# Patient Record
Sex: MALE | Race: WHITE | NOT HISPANIC OR LATINO | Employment: OTHER | URBAN - METROPOLITAN AREA
[De-identification: names, ages, dates, MRNs, and addresses within clinical notes are randomized per-mention and may not be internally consistent; named-entity substitution may affect disease eponyms.]

---

## 2017-10-12 ENCOUNTER — HOSPITAL ENCOUNTER (OUTPATIENT)
Dept: OTHER | Facility: HOSPITAL | Age: 62
Setting detail: SPECIMEN
Discharge: HOME | End: 2017-10-12
Attending: UROLOGY | Admitting: UROLOGY

## 2017-10-12 LAB — SPECIMEN SOURCE: NORMAL

## 2021-12-20 ENCOUNTER — TRANSCRIBE ORDERS (OUTPATIENT)
Dept: ADMINISTRATIVE | Facility: HOSPITAL | Age: 66
End: 2021-12-20

## 2021-12-20 DIAGNOSIS — G44.009 CLUSTER HEADACHE, NOT INTRACTABLE, UNSPECIFIED CHRONICITY PATTERN: Primary | ICD-10-CM

## 2022-01-03 ENCOUNTER — HOSPITAL ENCOUNTER (OUTPATIENT)
Dept: CT IMAGING | Facility: HOSPITAL | Age: 67
Discharge: HOME | End: 2022-01-03
Admitting: FAMILY MEDICINE

## 2022-01-03 DIAGNOSIS — G44.009 CLUSTER HEADACHE, NOT INTRACTABLE, UNSPECIFIED CHRONICITY PATTERN: ICD-10-CM

## 2022-01-03 LAB — CREAT BLDA-MCNC: 1.3 MG/DL

## 2022-01-03 PROCEDURE — 0 IOPAMIDOL PER 1 ML: Performed by: FAMILY MEDICINE

## 2022-01-03 PROCEDURE — 70470 CT HEAD/BRAIN W/O & W/DYE: CPT

## 2022-01-03 PROCEDURE — 82565 ASSAY OF CREATININE: CPT

## 2022-01-03 RX ADMIN — IOPAMIDOL 50 ML: 755 INJECTION, SOLUTION INTRAVENOUS at 16:57

## 2022-07-18 ENCOUNTER — HOSPITAL ENCOUNTER (OUTPATIENT)
Dept: GENERAL RADIOLOGY | Facility: HOSPITAL | Age: 67
Discharge: HOME | End: 2022-07-18
Admitting: UROLOGY

## 2022-07-18 ENCOUNTER — TRANSCRIBE ORDERS (OUTPATIENT)
Dept: GENERAL RADIOLOGY | Facility: HOSPITAL | Age: 67
End: 2022-07-18

## 2022-07-18 ENCOUNTER — TRANSCRIBE ORDERS (OUTPATIENT)
Dept: ADMINISTRATIVE | Facility: HOSPITAL | Age: 67
End: 2022-07-18

## 2022-07-18 DIAGNOSIS — N13.8 BPH WITH URINARY OBSTRUCTION: ICD-10-CM

## 2022-07-18 DIAGNOSIS — Z87.442 PERSONAL HISTORY OF URINARY CALCULI: ICD-10-CM

## 2022-07-18 DIAGNOSIS — N40.1 BPH WITH URINARY OBSTRUCTION: ICD-10-CM

## 2022-07-18 DIAGNOSIS — N20.0 CALCULUS, RENAL: ICD-10-CM

## 2022-07-18 DIAGNOSIS — N20.0 CALCULUS, RENAL: Primary | ICD-10-CM

## 2022-07-18 PROCEDURE — 74018 RADEX ABDOMEN 1 VIEW: CPT

## 2022-08-02 ENCOUNTER — OFFICE VISIT (OUTPATIENT)
Dept: SURGERY | Facility: CLINIC | Age: 67
End: 2022-08-02

## 2022-08-02 ENCOUNTER — PREP FOR SURGERY (OUTPATIENT)
Dept: OTHER | Facility: HOSPITAL | Age: 67
End: 2022-08-02

## 2022-08-02 VITALS — OXYGEN SATURATION: 97 % | HEIGHT: 72 IN | WEIGHT: 221.2 LBS | BODY MASS INDEX: 29.96 KG/M2 | HEART RATE: 59 BPM

## 2022-08-02 DIAGNOSIS — Z86.010 HISTORY OF COLONIC POLYPS: ICD-10-CM

## 2022-08-02 DIAGNOSIS — Z12.11 SCREENING FOR MALIGNANT NEOPLASM OF COLON: Primary | ICD-10-CM

## 2022-08-02 PROBLEM — Z86.0100 HISTORY OF COLONIC POLYPS: Status: ACTIVE | Noted: 2022-08-02

## 2022-08-02 PROCEDURE — S0260 H&P FOR SURGERY: HCPCS | Performed by: NURSE PRACTITIONER

## 2022-08-02 RX ORDER — ALLOPURINOL 300 MG/1
300 TABLET ORAL NIGHTLY
COMMUNITY
Start: 2022-06-13

## 2022-08-02 RX ORDER — POLYETHYLENE GLYCOL 3350 17 G/17G
POWDER, FOR SOLUTION ORAL
Qty: 238 PACKET | Refills: 0 | Status: ON HOLD | OUTPATIENT
Start: 2022-08-02 | End: 2022-11-14

## 2022-08-02 RX ORDER — PANTOPRAZOLE SODIUM 40 MG/1
40 TABLET, DELAYED RELEASE ORAL DAILY
COMMUNITY
Start: 2022-06-13

## 2022-08-02 RX ORDER — HYDROCHLOROTHIAZIDE 12.5 MG/1
12.5 TABLET ORAL DAILY
COMMUNITY
Start: 2022-06-13

## 2022-08-02 NOTE — PROGRESS NOTES
Chief Complaint: Colonoscopy    Subjective      Colonoscopy consultation       History of Present Illness  Ramesh Garcia is a 66 y.o. male presents to [facility] for colonoscopy consultation.    Patient presents today on referral from Dr. Sushant Stack for colonoscopy consultation.  Patient denies any abdominal pain, change in bowel habit, rectal bleeding.    Denies any family history of colorectal cancer.     Patient reports last colonoscopy was 10 years ago and had several polyps removed.    Objective     Past Medical History:   Diagnosis Date   • Cancer of kidney (HCC)    • Concussion    • Hypertension    • Hypothyroidism        Past Surgical History:   Procedure Laterality Date   • CERVICAL SPINE SURGERY     • ELBOW PROCEDURE     • KIDNEY SURGERY  2006    removed 2006 cancer   • NECK SURGERY  10/23/2015    ACDF C5-C6 and C6-C7-Dr. Suggs   • SHOULDER SURGERY     • SHOULDER SURGERY      left 2010, right 2012       Outpatient Medications Marked as Taking for the 8/2/22 encounter (Office Visit) with Kyra Quintana APRN   Medication Sig Dispense Refill   • allopurinol (ZYLOPRIM) 300 MG tablet      • aspirin 81 MG tablet Take by mouth.     • carvedilol (COREG) 6.25 MG tablet Take 6.25 mg by mouth 2 (two) times a day with meals.       • diltiazem CD (CARDIZEM CD) 300 MG 24 hr capsule Take by mouth.     • Fenofibrate 150 MG capsule Take by mouth.     • hydroCHLOROthiazide (HYDRODIURIL) 12.5 MG tablet      • levothyroxine (SYNTHROID, LEVOTHROID) 137 MCG tablet Take by mouth.     • pantoprazole (PROTONIX) 40 MG EC tablet          No Known Allergies     Family History   Problem Relation Age of Onset   • Hypertension Mother    • Breast cancer Mother    • Hypertension Father    • Heart attack Father    • Breast cancer Sister    • Breast cancer Paternal Grandmother        Social History     Socioeconomic History   • Marital status:    Tobacco Use   • Smoking status: Former Smoker   •  "Smokeless tobacco: Former User   Vaping Use   • Vaping Use: Never used   Substance and Sexual Activity   • Alcohol use: No       Review of Systems   Constitutional: Negative for chills and fever.   Gastrointestinal: Negative for abdominal distention, abdominal pain, anal bleeding, blood in stool, constipation, diarrhea and rectal pain.        Vital Signs:   Pulse 59   Ht 182.9 cm (72\")   Wt 100 kg (221 lb 3.2 oz)   SpO2 97%   BMI 30.00 kg/m²      Physical Exam  Constitutional:       General: He is not in acute distress.     Appearance: Normal appearance.   HENT:      Head: Normocephalic.   Cardiovascular:      Rate and Rhythm: Normal rate.   Pulmonary:      Effort: Pulmonary effort is normal.      Breath sounds: No stridor. No wheezing.   Abdominal:      General: Abdomen is flat.      Palpations: Abdomen is soft.      Tenderness: There is no abdominal tenderness.   Musculoskeletal:         General: No deformity. Normal range of motion.   Skin:     General: Skin is warm and dry.      Coloration: Skin is not jaundiced.   Neurological:      General: No focal deficit present.      Mental Status: He is alert and oriented to person, place, and time.   Psychiatric:         Mood and Affect: Mood normal.         Thought Content: Thought content normal.          Result Review :          []  Laboratory  []  Radiology  []  Pathology  []  Microbiology  []  EKG/Telemetry   []  Cardiology/Vascular   [x]  Old records  Today I have reviewed Dr. Stack's previous office note.     Assessment and Plan    Diagnoses and all orders for this visit:    1. Screening for malignant neoplasm of colon (Primary)    2. History of colonic polyps    Other orders  -     polyethylene glycol (MIRALAX) 17 g packet; Take as directed.  Instructions given in office.  Dispense: 238 g bottle  Dispense: 238 packet; Refill: 0    orange prep    Follow Up   Return for Schedule colonoscopy with Dr. Purcell in 11/4/2022 at [facility]. "     Hospital arrival time: 0730    Possible risks/complications, benefits, and alternatives to surgical or invasive procedure have been explained to patient and/or legal guardian.     Patient has been evaluated and can tolerate anesthesia and/or sedation. Risks, benefits, and alternatives to anesthesia and sedation have been explained to patient and/or legal guardian.  Patient verbalizes understanding and is will proceed with above plan.    Patient was given instructions and counseling regarding his condition or for health maintenance advice. Please see specific information pulled into the AVS if appropriate.

## 2022-11-01 ENCOUNTER — TELEPHONE (OUTPATIENT)
Dept: SURGERY | Facility: CLINIC | Age: 67
End: 2022-11-01

## 2022-11-01 NOTE — TELEPHONE ENCOUNTER
Caller: SARA     Relationship: [facility]     Best call back number: [phone]    INCOMING CALL FROM PT [facility]. REQUESTING COLONOSCOPY PREP INSTRUCTIONS BE RE-MAILED TO THEM.

## 2022-11-03 ENCOUNTER — TRANSCRIBE ORDERS (OUTPATIENT)
Dept: ADMINISTRATIVE | Facility: HOSPITAL | Age: 67
End: 2022-11-03

## 2022-11-03 DIAGNOSIS — N20.0 CALCULUS OF KIDNEY: Primary | ICD-10-CM

## 2022-11-09 ENCOUNTER — HOSPITAL ENCOUNTER (OUTPATIENT)
Dept: CT IMAGING | Facility: HOSPITAL | Age: 67
Discharge: HOME | End: 2022-11-09
Admitting: FAMILY MEDICINE

## 2022-11-09 DIAGNOSIS — N20.0 CALCULUS OF KIDNEY: ICD-10-CM

## 2022-11-09 PROCEDURE — 74176 CT ABD & PELVIS W/O CONTRAST: CPT

## 2022-11-10 ENCOUNTER — TELEPHONE (OUTPATIENT)
Dept: UROLOGY | Facility: CLINIC | Age: 67
End: 2022-11-10

## 2022-11-10 RX ORDER — CARVEDILOL 12.5 MG/1
12.5 TABLET ORAL 2 TIMES DAILY WITH MEALS
COMMUNITY

## 2022-11-10 NOTE — TELEPHONE ENCOUNTER
----- Message from Deidre Joseph sent at 11/10/2022  9:20 AM EST -----  Regarding: FW: APPT  3-4 weeks  ----- Message -----  From: Jaime Foy RegSched Rep  Sent: 11/10/2022   8:56 AM EST  To: Deidre Joseph  Subject: APPT                                             REFERRAL IN [facility]/ [facility]/PT NOW HAD CT/RECORDS IN Epic/PLEASE ADVISE ON APPT??

## 2022-11-14 ENCOUNTER — ANESTHESIA EVENT (OUTPATIENT)
Dept: GASTROENTEROLOGY | Facility: HOSPITAL | Age: 67
End: 2022-11-14

## 2022-11-14 ENCOUNTER — HOSPITAL ENCOUNTER (OUTPATIENT)
Facility: HOSPITAL | Age: 67
Setting detail: HOSPITAL OUTPATIENT SURGERY
Discharge: HOME | End: 2022-11-14
Attending: SURGERY | Admitting: SURGERY

## 2022-11-14 ENCOUNTER — ANESTHESIA (OUTPATIENT)
Dept: GASTROENTEROLOGY | Facility: HOSPITAL | Age: 67
End: 2022-11-14

## 2022-11-14 VITALS
OXYGEN SATURATION: 95 % | SYSTOLIC BLOOD PRESSURE: 136 MMHG | RESPIRATION RATE: 18 BRPM | DIASTOLIC BLOOD PRESSURE: 67 MMHG | HEART RATE: 54 BPM | WEIGHT: 216.93 LBS | TEMPERATURE: 97.6 F | BODY MASS INDEX: 29.38 KG/M2 | HEIGHT: 72 IN

## 2022-11-14 PROCEDURE — 25010000002 PROPOFOL 10 MG/ML EMULSION: Performed by: NURSE ANESTHETIST, CERTIFIED REGISTERED

## 2022-11-14 RX ORDER — ONDANSETRON 4 MG/1
4 TABLET, FILM COATED ORAL ONCE AS NEEDED
Status: DISCONTINUED | OUTPATIENT
Start: 2022-11-14 | End: 2022-11-14 | Stop reason: HOSPADM

## 2022-11-14 RX ORDER — SODIUM CHLORIDE, SODIUM LACTATE, POTASSIUM CHLORIDE, CALCIUM CHLORIDE 600; 310; 30; 20 MG/100ML; MG/100ML; MG/100ML; MG/100ML
30 INJECTION, SOLUTION INTRAVENOUS CONTINUOUS
Status: DISCONTINUED | OUTPATIENT
Start: 2022-11-14 | End: 2022-11-14 | Stop reason: HOSPADM

## 2022-11-14 RX ORDER — LIDOCAINE HYDROCHLORIDE 20 MG/ML
INJECTION, SOLUTION EPIDURAL; INFILTRATION; INTRACAUDAL; PERINEURAL AS NEEDED
Status: DISCONTINUED | OUTPATIENT
Start: 2022-11-14 | End: 2022-11-14 | Stop reason: SURG

## 2022-11-14 RX ORDER — PROPOFOL 10 MG/ML
VIAL (ML) INTRAVENOUS AS NEEDED
Status: DISCONTINUED | OUTPATIENT
Start: 2022-11-14 | End: 2022-11-14 | Stop reason: SURG

## 2022-11-14 RX ORDER — ONDANSETRON 2 MG/ML
4 INJECTION INTRAMUSCULAR; INTRAVENOUS ONCE AS NEEDED
Status: DISCONTINUED | OUTPATIENT
Start: 2022-11-14 | End: 2022-11-14 | Stop reason: HOSPADM

## 2022-11-14 RX ADMIN — PROPOFOL 80 MG: 10 INJECTION, EMULSION INTRAVENOUS at 08:39

## 2022-11-14 RX ADMIN — LIDOCAINE HYDROCHLORIDE 30 MG: 20 INJECTION, SOLUTION EPIDURAL; INFILTRATION; INTRACAUDAL; PERINEURAL at 08:39

## 2022-11-14 RX ADMIN — SODIUM CHLORIDE, POTASSIUM CHLORIDE, SODIUM LACTATE AND CALCIUM CHLORIDE 30 ML/HR: 600; 310; 30; 20 INJECTION, SOLUTION INTRAVENOUS at 07:54

## 2022-11-14 RX ADMIN — PROPOFOL 150 MCG/KG/MIN: 10 INJECTION, EMULSION INTRAVENOUS at 08:39

## 2022-11-14 NOTE — ANESTHESIA POSTPROCEDURE EVALUATION
Patient: Ramesh Garcia    Procedure Summary     Date: 11/14/22 Room / Location: [facility] ENDOSCOPY 1 / [facility]    Anesthesia Start: 0835 Anesthesia Stop: 0901    Procedure: COLONOSCOPY Diagnosis:       Screening for malignant neoplasm of colon      History of colonic polyps      (Screening for malignant neoplasm of colon [Z12.11])      (History of colonic polyps [Z86.010])    Surgeons: Maik Purcell MD Provider: John Cuevas MD    Anesthesia Type: general ASA Status: 3          Anesthesia Type: general    Vitals  Vitals Value Taken Time   /61 11/14/22 0911   Temp 36.4 °C (97.6 °F) 11/14/22 0900   Pulse 54 11/14/22 0915   Resp 15 11/14/22 0905   SpO2 95 % 11/14/22 0915   Vitals shown include unvalidated device data.        Post Anesthesia Care and Evaluation    Patient location during evaluation: bedside  Patient participation: complete - patient participated  Level of consciousness: awake  Pain management: adequate    Airway patency: patent  Anesthetic complications: No anesthetic complications  PONV Status: none  Cardiovascular status: acceptable and stable  Respiratory status: acceptable  Hydration status: acceptable    Comments: An Anesthesiologist personally participated in the most demanding procedures (including induction and emergence if applicable) in the anesthesia plan, monitored the course of anesthesia administration at frequent intervals and remained physically present and available for immediate diagnosis and treatment of emergencies.

## 2022-11-14 NOTE — H&P
[facility]   HISTORY AND PHYSICAL    Patient Name: Ramesh Garcia  : 1955  MRN: [id number]  Primary Care Physician:  Sushant Stack MD  Date of admission: 2022    Subjective   Subjective     Chief Complaint: Colon cancer screening    HPI:    Ramesh Garcia is a 67 y.o. male who has a prior history of colon polyps.  He was referred back for repeat colon screening.    Review of Systems   Respiratory: Negative for shortness of breath.    Cardiovascular: Negative for chest pain.   Gastrointestinal: Negative for abdominal pain.       Personal History     Past Medical History:   Diagnosis Date   • Cancer of kidney (HCC)    • Concussion    • GERD (gastroesophageal reflux disease)    • Hyperlipidemia    • Hypertension    • Hypothyroidism    • Kidney stones    • Sleep apnea        Past Surgical History:   Procedure Laterality Date   • CERVICAL SPINE SURGERY     • ELBOW PROCEDURE     • KIDNEY SURGERY      removed 2006 cancer   • NECK SURGERY  10/23/2015    ACDF C5-C6 and C6-C7-Dr. Suggs   • SHOULDER SURGERY     • SHOULDER SURGERY      left , right        Family History: family history includes Breast cancer in his mother, paternal grandmother, and sister; Heart attack in his father; Hypertension in his father and mother. Otherwise pertinent FHx was reviewed and not pertinent to current issue.    Social History:  reports that he has quit smoking. He has quit using smokeless tobacco. He reports that he does not drink alcohol and does not use drugs.    Home Medications:  allopurinol, aspirin, carvedilol, choline fenofibrate, dilTIAZem CD, hydroCHLOROthiazide, levothyroxine, and pantoprazole    Allergies:  No Known Allergies    Objective    Objective     Vitals:   Temp:  [98.9 °F (37.2 °C)] 98.9 °F (37.2 °C)  Heart Rate:  [59] 59  Resp:  [18] 18  BP: (141)/(79) 141/79    Physical Exam  HENT:      Head: Normocephalic.   Cardiovascular:      Rate and Rhythm: Normal rate.   Pulmonary:       Effort: Pulmonary effort is normal.   Abdominal:      Palpations: Abdomen is soft.   Musculoskeletal:         General: Normal range of motion.      Cervical back: Normal range of motion.   Skin:     General: Skin is warm.   Neurological:      General: No focal deficit present.      Mental Status: He is alert.   Psychiatric:         Mood and Affect: Mood normal.         Result Review    Result Review:  I have personally reviewed the results from the time of this admission to 11/14/2022 08:31 EST and agree with these findings:  []  Laboratory  []  Microbiology  []  Radiology  []  EKG/Telemetry   []  Cardiology/Vascular   []  Pathology  []  Old records  []  Other:  Most notable findings include:     Assessment & Plan   Assessment / Plan     Brief Patient Summary:  Ramesh Garcia is a 67 y.o. male who was referred for colon screening.    Active Hospital Problems:  Active Hospital Problems    Diagnosis    • Screening for malignant neoplasm of colon    • History of colonic polyps        Plan:   We will proceed with a colonoscopy.  Risk benefits and alternatives were explained.    DVT prophylaxis:  No DVT prophylaxis order currently exists.    CODE STATUS:         Admission Status:  I believe this patient meets outpatient status.    Electronically signed by Maik Purcell MD, 11/14/22, 8:31 AM EST.

## 2022-11-14 NOTE — ANESTHESIA PREPROCEDURE EVALUATION
Anesthesia Evaluation     Patient summary reviewed and Nursing notes reviewed   no history of anesthetic complications:  NPO Solid Status: > 8 hours  NPO Liquid Status: > 2 hours           Airway   Mallampati: III  TM distance: >3 FB  Neck ROM: full  No difficulty expected  Dental    (+) upper dentures and lower dentures    Pulmonary - normal exam    breath sounds clear to auscultation  (+) sleep apnea,   Cardiovascular - normal exam  Exercise tolerance: good (4-7 METS)    Rhythm: regular  Rate: normal    (+) hypertension, hyperlipidemia,       Neuro/Psych  (+) numbness,    GI/Hepatic/Renal/Endo    (+)  GERD,  renal disease CRI, thyroid problem hypothyroidism    Musculoskeletal (-) negative ROS    Abdominal    Substance History - negative use     OB/GYN negative ob/gyn ROS         Other      history of cancer    ROS/Med Hx Other: PAT Nursing Notes unavailable.       Phys Exam Other: Partial lower  Full upper                Anesthesia Plan    ASA 3     general     (Total IV Anesthesia    Patient understands anesthesia not responsible for dental damage.  )  intravenous induction     Anesthetic plan, risks, benefits, and alternatives have been provided, discussed and informed consent has been obtained with: patient.    Plan discussed with CRNA.        CODE STATUS:

## 2022-12-04 PROBLEM — N20.0 NEPHROLITHIASIS: Status: ACTIVE | Noted: 2022-12-04

## 2022-12-04 PROBLEM — Z90.5 SOLITARY KIDNEY, ACQUIRED: Status: ACTIVE | Noted: 2022-12-04

## 2022-12-04 NOTE — PROGRESS NOTES
Chief Complaint: Urologic complaint    Subjective         History of Present Illness  Ramesh Garcia is a 67 y.o. male       Solitary right kidney  Nephrolithiasis      Here to establish care [facility] with [facility] lives in this area      Patient with some frequency, no straining.  Voiding without issue no prostate meds      No GH      Depending on his health should come out, complicated by solitary kidney.      11/9/2022 CT abdomen/pelvis without - prior left nephrectomy, 1.2 cm nonobstructing stone lower pole right kidney.  Significantly larger than it was on scan from 2011.  Images reviewed.     6/22 1.1, GFR 68             12/17 calcium oxalate dihydrate 20, calcium oxalate monohydrate 80      On allopurinol for stone -history of uric acid nephrolithiasis    5/18 Litholink - uric acid levels down on allopurinol, urine oxalate decreased, urine calcium elevated.    No urologic family history    No cardiopulmonary history.  Non-smoker.  ASA 81      Patient has never passed stones, 7 lithotripsies several ESWL's      2006 left nephrectomy renal cell carcinoma [facility]      PSA    7/22   0.98       1/21   0.9  3/19   0.9  9/15   0.7                        Objective     Past Medical History:   Diagnosis Date   • Cancer of kidney (HCC)    • Concussion    • GERD (gastroesophageal reflux disease)    • Hyperlipidemia    • Hypertension    • Hypothyroidism    • Kidney stones    • Sleep apnea        Past Surgical History:   Procedure Laterality Date   • CERVICAL SPINE SURGERY     • COLONOSCOPY N/A 11/14/2022    Procedure: COLONOSCOPY;  Surgeon: Maik Purcell MD;  [facility]: [facility] END[facility];  Service: General;  Laterality: N/A;  DIVERTICULOSIS   • ELBOW PROCEDURE     • KIDNEY SURGERY  2006    removed 2006 cancer   • NECK SURGERY  10/23/2015    ACDF C5-C6 and C6-C7-Dr. Suggs   • SHOULDER SURGERY     • SHOULDER SURGERY      left 2010, right 2012         Current Outpatient Medications:   •   allopurinol (ZYLOPRIM) 300 MG tablet, Take 1 tablet by mouth Daily., Disp: , Rfl:   •  aspirin 81 MG tablet, Take 1 tablet by mouth Daily., Disp: , Rfl:   •  carvedilol (COREG) 12.5 MG tablet, Take 1 tablet by mouth 2 (Two) Times a Day With Meals., Disp: , Rfl:   •  choline fenofibrate (TRILIPIX) 135 MG capsule, Take 1 capsule by mouth Daily., Disp: , Rfl:   •  diltiazem CD (CARDIZEM CD) 300 MG 24 hr capsule, Take 1 capsule by mouth Daily., Disp: , Rfl:   •  hydroCHLOROthiazide (HYDRODIURIL) 12.5 MG tablet, Take 1 tablet by mouth Daily., Disp: , Rfl:   •  levothyroxine (SYNTHROID, LEVOTHROID) 137 MCG tablet, Take 1 tablet by mouth Daily., Disp: , Rfl:   •  pantoprazole (PROTONIX) 40 MG EC tablet, Take 1 tablet by mouth Daily., Disp: , Rfl:     No Known Allergies     Family History   Problem Relation Age of Onset   • Hypertension Mother    • Breast cancer Mother    • Hypertension Father    • Heart attack Father    • Breast cancer Sister    • Breast cancer Paternal Grandmother    • Malig Hyperthermia Neg Hx        Social History     Socioeconomic History   • Marital status:    Tobacco Use   • Smoking status: Former   • Smokeless tobacco: Former   • Tobacco comments:     Quit- over 30 years ago   Vaping Use   • Vaping Use: Never used   Substance and Sexual Activity   • Alcohol use: No   • Drug use: Never   • Sexual activity: Defer       Vital Signs:   There were no vitals taken for this visit.     Physical exam    Alert and orient x3  Well appearing, well developed, in no acute distress   Unlabored respirations  Nontender/nondistended      Grossly oriented to person, place and time, judgment is intact, normal mood and affect    Results for orders placed or performed during the hospital encounter of 01/03/22   POC Creatinine    Specimen: Blood   Result Value Ref Range    Creatinine 1.30 mg/dL    GFR MDRD       GFR MDRD Non African American 55 mL/min/1.73 sq.M             Assessment and Plan     Diagnoses and all orders for this visit:    1. Nephrolithiasis (Primary)    2. Solitary kidney, acquired        CT images and read reviewed and discussed with patient    Records reviewed today and summarized in the chart      Patient with a solitary kidney and 1 cm with a stone in the lower pole of the right solitary kidney.  I did recommend cystoscopy with right ureteroscopy with laser and right ureteral stent placement.  We will do this to prevent renal failure if he went to pass the stones.  Risks and benefits were discussed including bleeding, infection and damage to the urinary system.  We also discussed the risk of anesthesia up to and including death.  Patient voiced understanding and would like to proceed.

## 2022-12-06 ENCOUNTER — PREP FOR SURGERY (OUTPATIENT)
Dept: OTHER | Facility: HOSPITAL | Age: 67
End: 2022-12-06

## 2022-12-06 ENCOUNTER — OFFICE VISIT (OUTPATIENT)
Dept: UROLOGY | Facility: CLINIC | Age: 67
End: 2022-12-06

## 2022-12-06 VITALS — HEIGHT: 70 IN | BODY MASS INDEX: 31.92 KG/M2 | RESPIRATION RATE: 18 BRPM | WEIGHT: 223 LBS

## 2022-12-06 DIAGNOSIS — N20.1 URETERAL STONE: Primary | ICD-10-CM

## 2022-12-06 DIAGNOSIS — Z90.5 SOLITARY KIDNEY, ACQUIRED: ICD-10-CM

## 2022-12-06 DIAGNOSIS — N20.0 NEPHROLITHIASIS: Primary | ICD-10-CM

## 2022-12-06 DIAGNOSIS — N20.0 NEPHROLITHIASIS: ICD-10-CM

## 2022-12-06 PROCEDURE — 99204 OFFICE O/P NEW MOD 45 MIN: CPT | Performed by: UROLOGY

## 2022-12-06 RX ORDER — SODIUM CHLORIDE 9 MG/ML
100 INJECTION, SOLUTION INTRAVENOUS CONTINUOUS
Status: CANCELLED | OUTPATIENT
Start: 2022-12-06

## 2022-12-06 RX ORDER — LEVOFLOXACIN 5 MG/ML
500 INJECTION, SOLUTION INTRAVENOUS ONCE
Status: CANCELLED | OUTPATIENT
Start: 2022-12-06 | End: 2022-12-06

## 2022-12-27 ENCOUNTER — TELEPHONE (OUTPATIENT)
Dept: UROLOGY | Facility: CLINIC | Age: 67
End: 2022-12-27

## 2023-01-09 ENCOUNTER — LAB (OUTPATIENT)
Dept: LAB | Facility: HOSPITAL | Age: 68
End: 2023-01-09
Payer: MEDICARE

## 2023-01-09 ENCOUNTER — APPOINTMENT (OUTPATIENT)
Dept: CT IMAGING | Facility: HOSPITAL | Age: 68
End: 2023-01-09

## 2023-01-09 DIAGNOSIS — Z90.5 SOLITARY KIDNEY, ACQUIRED: ICD-10-CM

## 2023-01-09 DIAGNOSIS — N20.0 NEPHROLITHIASIS: ICD-10-CM

## 2023-01-09 PROCEDURE — 87086 URINE CULTURE/COLONY COUNT: CPT

## 2023-01-10 LAB — BACTERIA SPEC AEROBE CULT: NO GROWTH

## 2023-01-17 RX ORDER — MULTIPLE VITAMINS W/ MINERALS TAB 9MG-400MCG
1 TAB ORAL DAILY
COMMUNITY

## 2023-01-17 RX ORDER — CHLORAL HYDRATE 500 MG
CAPSULE ORAL
COMMUNITY

## 2023-01-17 NOTE — PRE-PROCEDURE INSTRUCTIONS
PRE-OP INSTRUCTIONS REVIEWED WITH PATIENT: FASTING/BATHING/ARRIVAL PROCEDURES.  INSTRUCTED TO TAKE A.M. DAY OF SURGERY: CARVEDILOL, CARDIZEM, SYNTHROID, PANTOPRAZOL  UNDERSTANDING VERBALIZED.

## 2023-01-19 ENCOUNTER — APPOINTMENT (OUTPATIENT)
Dept: GENERAL RADIOLOGY | Facility: HOSPITAL | Age: 68
End: 2023-01-19
Payer: MEDICARE

## 2023-01-19 ENCOUNTER — ANESTHESIA (OUTPATIENT)
Dept: PERIOP | Facility: HOSPITAL | Age: 68
End: 2023-01-19
Payer: MEDICARE

## 2023-01-19 ENCOUNTER — ANESTHESIA EVENT (OUTPATIENT)
Dept: PERIOP | Facility: HOSPITAL | Age: 68
End: 2023-01-19
Payer: MEDICARE

## 2023-01-19 ENCOUNTER — HOSPITAL ENCOUNTER (OUTPATIENT)
Facility: HOSPITAL | Age: 68
Discharge: HOME | End: 2023-01-19
Attending: UROLOGY | Admitting: UROLOGY
Payer: MEDICARE

## 2023-01-19 VITALS
RESPIRATION RATE: 16 BRPM | DIASTOLIC BLOOD PRESSURE: 61 MMHG | TEMPERATURE: 97.2 F | HEIGHT: 71 IN | SYSTOLIC BLOOD PRESSURE: 121 MMHG | OXYGEN SATURATION: 97 % | WEIGHT: 221.78 LBS | BODY MASS INDEX: 31.05 KG/M2 | HEART RATE: 56 BPM

## 2023-01-19 DIAGNOSIS — N20.0 NEPHROLITHIASIS: ICD-10-CM

## 2023-01-19 LAB
ANION GAP SERPL CALCULATED.3IONS-SCNC: 11.9 MMOL/L (ref 5–15)
BUN SERPL-MCNC: 27 MG/DL (ref 8–23)
BUN/CREAT SERPL: 21.6 (ref 7–25)
CALCIUM SPEC-SCNC: 9.5 MG/DL (ref 8.6–10.5)
CHLORIDE SERPL-SCNC: 100 MMOL/L (ref 98–107)
CO2 SERPL-SCNC: 24.1 MMOL/L (ref 22–29)
CREAT SERPL-MCNC: 1.25 MG/DL (ref 0.76–1.27)
EGFRCR SERPLBLD CKD-EPI 2021: 62.7 ML/MIN/1.73
GLUCOSE SERPL-MCNC: 114 MG/DL (ref 65–99)
POTASSIUM SERPL-SCNC: 3.8 MMOL/L (ref 3.5–5.2)
SODIUM SERPL-SCNC: 136 MMOL/L (ref 136–145)

## 2023-01-19 PROCEDURE — 25010000002 LEVOFLOXACIN PER 250 MG: Performed by: UROLOGY

## 2023-01-19 PROCEDURE — 25010000002 ONDANSETRON PER 1 MG: Performed by: NURSE ANESTHETIST, CERTIFIED REGISTERED

## 2023-01-19 PROCEDURE — 74018 RADEX ABDOMEN 1 VIEW: CPT

## 2023-01-19 PROCEDURE — 25010000002 DEXAMETHASONE PER 1 MG: Performed by: NURSE ANESTHETIST, CERTIFIED REGISTERED

## 2023-01-19 PROCEDURE — 25010000002 MIDAZOLAM PER 1 MG: Performed by: ANESTHESIOLOGY

## 2023-01-19 PROCEDURE — 88300 SURGICAL PATH GROSS: CPT | Performed by: UROLOGY

## 2023-01-19 PROCEDURE — 52352 CYSTOURETERO W/STONE REMOVE: CPT | Performed by: UROLOGY

## 2023-01-19 PROCEDURE — 25010000002 FENTANYL CITRATE (PF) 50 MCG/ML SOLUTION: Performed by: NURSE ANESTHETIST, CERTIFIED REGISTERED

## 2023-01-19 PROCEDURE — C2617 STENT, NON-COR, TEM W/O DEL: HCPCS | Performed by: UROLOGY

## 2023-01-19 PROCEDURE — 80048 BASIC METABOLIC PNL TOTAL CA: CPT | Performed by: UROLOGY

## 2023-01-19 PROCEDURE — 82365 CALCULUS SPECTROSCOPY: CPT | Performed by: UROLOGY

## 2023-01-19 PROCEDURE — 76000 FLUOROSCOPY <1 HR PHYS/QHP: CPT

## 2023-01-19 PROCEDURE — C1769 GUIDE WIRE: HCPCS | Performed by: UROLOGY

## 2023-01-19 PROCEDURE — 93010 ELECTROCARDIOGRAM REPORT: CPT | Performed by: INTERNAL MEDICINE

## 2023-01-19 PROCEDURE — C1894 INTRO/SHEATH, NON-LASER: HCPCS | Performed by: UROLOGY

## 2023-01-19 PROCEDURE — C1758 CATHETER, URETERAL: HCPCS | Performed by: UROLOGY

## 2023-01-19 PROCEDURE — 52332 CYSTOSCOPY AND TREATMENT: CPT | Performed by: UROLOGY

## 2023-01-19 PROCEDURE — 25010000002 PROPOFOL 10 MG/ML EMULSION: Performed by: NURSE ANESTHETIST, CERTIFIED REGISTERED

## 2023-01-19 PROCEDURE — 93005 ELECTROCARDIOGRAM TRACING: CPT | Performed by: UROLOGY

## 2023-01-19 DEVICE — STNT URETRL CLASSC DBL PIG 6F 28CM: Type: IMPLANTABLE DEVICE | Site: URETER | Status: FUNCTIONAL

## 2023-01-19 RX ORDER — MEPERIDINE HYDROCHLORIDE 25 MG/ML
12.5 INJECTION INTRAMUSCULAR; INTRAVENOUS; SUBCUTANEOUS
Status: DISCONTINUED | OUTPATIENT
Start: 2023-01-19 | End: 2023-01-19 | Stop reason: HOSPADM

## 2023-01-19 RX ORDER — PROMETHAZINE HYDROCHLORIDE 12.5 MG/1
12.5 TABLET ORAL ONCE AS NEEDED
Status: DISCONTINUED | OUTPATIENT
Start: 2023-01-19 | End: 2023-01-19 | Stop reason: HOSPADM

## 2023-01-19 RX ORDER — ONDANSETRON 2 MG/ML
4 INJECTION INTRAMUSCULAR; INTRAVENOUS ONCE AS NEEDED
Status: DISCONTINUED | OUTPATIENT
Start: 2023-01-19 | End: 2023-01-19 | Stop reason: HOSPADM

## 2023-01-19 RX ORDER — HYDROCODONE BITARTRATE AND ACETAMINOPHEN 5; 325 MG/1; MG/1
1 TABLET ORAL ONCE AS NEEDED
Status: DISCONTINUED | OUTPATIENT
Start: 2023-01-19 | End: 2023-01-19 | Stop reason: HOSPADM

## 2023-01-19 RX ORDER — PROPOFOL 10 MG/ML
VIAL (ML) INTRAVENOUS AS NEEDED
Status: DISCONTINUED | OUTPATIENT
Start: 2023-01-19 | End: 2023-01-19 | Stop reason: SURG

## 2023-01-19 RX ORDER — ACETAMINOPHEN 325 MG/1
650 TABLET ORAL ONCE
Status: DISCONTINUED | OUTPATIENT
Start: 2023-01-19 | End: 2023-01-19 | Stop reason: HOSPADM

## 2023-01-19 RX ORDER — MIDAZOLAM HYDROCHLORIDE 1 MG/ML
2 INJECTION INTRAMUSCULAR; INTRAVENOUS ONCE
Status: COMPLETED | OUTPATIENT
Start: 2023-01-19 | End: 2023-01-19

## 2023-01-19 RX ORDER — ROCURONIUM BROMIDE 10 MG/ML
INJECTION, SOLUTION INTRAVENOUS AS NEEDED
Status: DISCONTINUED | OUTPATIENT
Start: 2023-01-19 | End: 2023-01-19 | Stop reason: SURG

## 2023-01-19 RX ORDER — PHENYLEPHRINE HCL IN 0.9% NACL 1 MG/10 ML
SYRINGE (ML) INTRAVENOUS AS NEEDED
Status: DISCONTINUED | OUTPATIENT
Start: 2023-01-19 | End: 2023-01-19 | Stop reason: SURG

## 2023-01-19 RX ORDER — MAGNESIUM HYDROXIDE 1200 MG/15ML
LIQUID ORAL AS NEEDED
Status: DISCONTINUED | OUTPATIENT
Start: 2023-01-19 | End: 2023-01-19 | Stop reason: HOSPADM

## 2023-01-19 RX ORDER — GLYCOPYRROLATE 0.2 MG/ML
INJECTION INTRAMUSCULAR; INTRAVENOUS AS NEEDED
Status: DISCONTINUED | OUTPATIENT
Start: 2023-01-19 | End: 2023-01-19 | Stop reason: SURG

## 2023-01-19 RX ORDER — PROMETHAZINE HYDROCHLORIDE 12.5 MG/1
25 TABLET ORAL ONCE AS NEEDED
Status: DISCONTINUED | OUTPATIENT
Start: 2023-01-19 | End: 2023-01-19 | Stop reason: HOSPADM

## 2023-01-19 RX ORDER — DEXAMETHASONE SODIUM PHOSPHATE 4 MG/ML
INJECTION, SOLUTION INTRA-ARTICULAR; INTRALESIONAL; INTRAMUSCULAR; INTRAVENOUS; SOFT TISSUE AS NEEDED
Status: DISCONTINUED | OUTPATIENT
Start: 2023-01-19 | End: 2023-01-19 | Stop reason: SURG

## 2023-01-19 RX ORDER — SODIUM CHLORIDE, SODIUM LACTATE, POTASSIUM CHLORIDE, CALCIUM CHLORIDE 600; 310; 30; 20 MG/100ML; MG/100ML; MG/100ML; MG/100ML
9 INJECTION, SOLUTION INTRAVENOUS CONTINUOUS PRN
Status: DISCONTINUED | OUTPATIENT
Start: 2023-01-19 | End: 2023-01-19 | Stop reason: HOSPADM

## 2023-01-19 RX ORDER — EPHEDRINE SULFATE 50 MG/ML
INJECTION, SOLUTION INTRAVENOUS AS NEEDED
Status: DISCONTINUED | OUTPATIENT
Start: 2023-01-19 | End: 2023-01-19 | Stop reason: SURG

## 2023-01-19 RX ORDER — FENTANYL CITRATE 50 UG/ML
INJECTION, SOLUTION INTRAMUSCULAR; INTRAVENOUS AS NEEDED
Status: DISCONTINUED | OUTPATIENT
Start: 2023-01-19 | End: 2023-01-19 | Stop reason: SURG

## 2023-01-19 RX ORDER — PROMETHAZINE HYDROCHLORIDE 25 MG/1
25 SUPPOSITORY RECTAL ONCE AS NEEDED
Status: DISCONTINUED | OUTPATIENT
Start: 2023-01-19 | End: 2023-01-19 | Stop reason: HOSPADM

## 2023-01-19 RX ORDER — ONDANSETRON 2 MG/ML
INJECTION INTRAMUSCULAR; INTRAVENOUS AS NEEDED
Status: DISCONTINUED | OUTPATIENT
Start: 2023-01-19 | End: 2023-01-19 | Stop reason: SURG

## 2023-01-19 RX ORDER — OXYCODONE HYDROCHLORIDE 5 MG/1
5 TABLET ORAL
Status: DISCONTINUED | OUTPATIENT
Start: 2023-01-19 | End: 2023-01-19 | Stop reason: HOSPADM

## 2023-01-19 RX ORDER — ACETAMINOPHEN 500 MG
1000 TABLET ORAL ONCE
Status: COMPLETED | OUTPATIENT
Start: 2023-01-19 | End: 2023-01-19

## 2023-01-19 RX ORDER — ONDANSETRON 4 MG/1
4 TABLET, FILM COATED ORAL ONCE AS NEEDED
Status: DISCONTINUED | OUTPATIENT
Start: 2023-01-19 | End: 2023-01-19 | Stop reason: HOSPADM

## 2023-01-19 RX ORDER — LIDOCAINE HYDROCHLORIDE 20 MG/ML
INJECTION, SOLUTION EPIDURAL; INFILTRATION; INTRACAUDAL; PERINEURAL AS NEEDED
Status: DISCONTINUED | OUTPATIENT
Start: 2023-01-19 | End: 2023-01-19 | Stop reason: SURG

## 2023-01-19 RX ORDER — HYDROCODONE BITARTRATE AND ACETAMINOPHEN 5; 325 MG/1; MG/1
1 TABLET ORAL EVERY 6 HOURS PRN
Qty: 12 TABLET | Refills: 0 | Status: SHIPPED | OUTPATIENT
Start: 2023-01-19

## 2023-01-19 RX ORDER — SODIUM CHLORIDE 9 MG/ML
100 INJECTION, SOLUTION INTRAVENOUS CONTINUOUS
Status: DISCONTINUED | OUTPATIENT
Start: 2023-01-19 | End: 2023-01-19 | Stop reason: HOSPADM

## 2023-01-19 RX ORDER — LEVOFLOXACIN 5 MG/ML
500 INJECTION, SOLUTION INTRAVENOUS ONCE
Status: COMPLETED | OUTPATIENT
Start: 2023-01-19 | End: 2023-01-19

## 2023-01-19 RX ADMIN — SODIUM CHLORIDE, POTASSIUM CHLORIDE, SODIUM LACTATE AND CALCIUM CHLORIDE: 600; 310; 30; 20 INJECTION, SOLUTION INTRAVENOUS at 09:21

## 2023-01-19 RX ADMIN — FENTANYL CITRATE 50 MCG: 50 INJECTION, SOLUTION INTRAMUSCULAR; INTRAVENOUS at 08:40

## 2023-01-19 RX ADMIN — FENTANYL CITRATE 50 MCG: 50 INJECTION, SOLUTION INTRAMUSCULAR; INTRAVENOUS at 08:51

## 2023-01-19 RX ADMIN — Medication 100 MCG: at 09:08

## 2023-01-19 RX ADMIN — MIDAZOLAM HYDROCHLORIDE 2 MG: 2 INJECTION, SOLUTION INTRAMUSCULAR; INTRAVENOUS at 08:08

## 2023-01-19 RX ADMIN — LIDOCAINE HYDROCHLORIDE 50 MG: 20 INJECTION, SOLUTION EPIDURAL; INFILTRATION; INTRACAUDAL; PERINEURAL at 08:40

## 2023-01-19 RX ADMIN — EPHEDRINE SULFATE 15 MG: 50 INJECTION INTRAVENOUS at 09:01

## 2023-01-19 RX ADMIN — ACETAMINOPHEN 1000 MG: 500 TABLET ORAL at 07:43

## 2023-01-19 RX ADMIN — ROCURONIUM BROMIDE 50 MG: 10 INJECTION, SOLUTION INTRAVENOUS at 08:40

## 2023-01-19 RX ADMIN — SODIUM CHLORIDE, POTASSIUM CHLORIDE, SODIUM LACTATE AND CALCIUM CHLORIDE 9 ML/HR: 600; 310; 30; 20 INJECTION, SOLUTION INTRAVENOUS at 07:24

## 2023-01-19 RX ADMIN — LEVOFLOXACIN 500 MG: 500 INJECTION, SOLUTION INTRAVENOUS at 08:36

## 2023-01-19 RX ADMIN — DEXAMETHASONE SODIUM PHOSPHATE 4 MG: 4 INJECTION, SOLUTION INTRA-ARTICULAR; INTRALESIONAL; INTRAMUSCULAR; INTRAVENOUS; SOFT TISSUE at 08:52

## 2023-01-19 RX ADMIN — PROPOFOL 180 MG: 10 INJECTION, EMULSION INTRAVENOUS at 08:40

## 2023-01-19 RX ADMIN — SUGAMMADEX 200 MG: 100 INJECTION, SOLUTION INTRAVENOUS at 09:22

## 2023-01-19 RX ADMIN — GLYCOPYRROLATE 0.4 MG: 0.2 INJECTION INTRAMUSCULAR; INTRAVENOUS at 08:58

## 2023-01-19 RX ADMIN — ONDANSETRON 4 MG: 2 INJECTION INTRAMUSCULAR; INTRAVENOUS at 08:52

## 2023-01-19 NOTE — OP NOTE
CYSTOSCOPY URETEROSCOPY  Procedure Report    Patient Name:  Ramesh Garcia  YOB: 1955    Date of Surgery:  1/19/2023      Pre-op Diagnosis:   Nephrolithiasis [N20.0]     Right solitary kidney    Postop diagnosis:    Same    Procedure/CPT® Codes:      Procedure(s):    Cystoscopy   right ureteroscopy with basket stone extraction    right ureteral stent placement.  6 x 28 with no string    Staff:  Surgeon(s):  Rony Rose MD         Anesthesia: General    Estimated Blood Loss: minimal    Implants:    Implant Name Type Inv. Item Serial No.  Lot No. LRB No. Used Action   STNT URETRL CLASSC DBL PIG 6F 28CM - [id number] Stent STNT URETRL CLASSC DBL PIG 6F 28CM  [facility] [id number] Right 1 Implanted       Specimen:          Specimens     ID Source Type Tests Collected By Collected At Frozen?    A Ureter, Right Calculus · TISSUE PATHOLOGY EXAM  · STONE ANALYSIS   Rony Rose MD 1/19/23 0842     Description: right ureteral stone              Findings:     4 cm prostate    Normal bladder      Instead of 1 large stone patient had multiple small stones average size 3 mm.  All were basketed out without issue.  Probably 15 stone    Stent placed with no string    Complications: none    Description of Procedure:     After informed consent patient taken to the operating room.  Patient was laid supine and placed under general anesthesia by the anesthesia team.  At this point patient was placed in dorsal lithotomy position and prepped and draped in normal sterile fashion.  A multidisciplinary timeout was undertaken documenting the correct patient site and procedure.  At this point a 22 rigid cystoscope was placed into the urethra . Bladder was normal.  At this point a Glidewire was placed up the right   ureter without any issue under fluoroscopic guidance.  I then placed a dual-lumen catheter and a stiff wire alongside the Glidewire under fluoroscopic guidance.  The dual-lumen was  removed and a ureteral access sheath was placed into the distal ureter without any problem.  I removed the obturator and wire and placed a flexible ureteroscope up the ueter.    Multiple small stones were identified in the lower pole calyx.  I took several minutes to basket all these out with no tip nitinol basket.      I then took the flexible ureteroscope up and check the rest of the ureter and the upper collecting system.  There were no further stones.  Brought the actual sheath out under direct vision and there was no further stones.      right side was free of stones.  A 6 x 28 ureteral stent was then placed over the Glidewire through a rigid cystoscope without issue and had a good curl in the bladder under direct vision and a good curl in the   right renal pelvis under fluoroscopy.  Bladder was drained.  Patient tolerated the procedure well, he was taken to the postanesthesia care unit without issue.      No string left on stent    Rony Rose MD     Date: 1/19/2023  Time: 09:23 EST

## 2023-01-19 NOTE — ANESTHESIA PREPROCEDURE EVALUATION
Anesthesia Evaluation     Patient summary reviewed and Nursing notes reviewed   no history of anesthetic complications:  NPO Solid Status: > 8 hours  NPO Liquid Status: > 2 hours           Airway   Mallampati: II  TM distance: >3 FB  Neck ROM: full  No difficulty expected  Dental    (+) upper dentures, lower dentures and partials    Pulmonary - normal exam    breath sounds clear to auscultation  (+) sleep apnea,   Cardiovascular - normal exam  Exercise tolerance: good (4-7 METS)    Rhythm: regular  Rate: normal    (+) hypertension, hyperlipidemia,       Neuro/Psych  (+) numbness,    GI/Hepatic/Renal/Endo    (+)  GERD well controlled,  renal disease, thyroid problem hypothyroidism    Musculoskeletal (-) negative ROS    Abdominal    Substance History - negative use     OB/GYN negative ob/gyn ROS         Other - negative ROS       ROS/Med Hx Other: PAT Nursing Notes unavailable.                 Anesthesia Plan    ASA 2     general     (Patient understands anesthesia not responsible for dental damage.)  intravenous induction     Anesthetic plan, risks, benefits, and alternatives have been provided, discussed and informed consent has been obtained with: patient.  Pre-procedure education provided  Plan discussed with CRNA.        CODE STATUS:

## 2023-01-19 NOTE — ANESTHESIA POSTPROCEDURE EVALUATION
Patient: Ramesh Garcia    Procedure Summary     Date: 01/19/23 Room / Location: [facility] OR 02 / [facility] MAIN OR    Anesthesia Start: 0836 Anesthesia Stop: 0931    Procedure: cystoscopy with right ureteroscopy with basket stone extraction and right ureteral stent placement. (Right) Diagnosis:       Nephrolithiasis      (Nephrolithiasis [N20.0])    Surgeons: Rony Rose MD Provider: Victorina Aguilar MD    Anesthesia Type: general ASA Status: 2          Anesthesia Type: general    Vitals  Vitals Value Taken Time   /62 01/19/23 0956   Temp 36.8 °C (98.3 °F) 01/19/23 0929   Pulse 58 01/19/23 0957   Resp 13 01/19/23 0950   SpO2 94 % 01/19/23 0957   Vitals shown include unvalidated device data.        Post Anesthesia Care and Evaluation    Patient location during evaluation: bedside  Patient participation: complete - patient participated  Level of consciousness: awake  Pain management: adequate    Airway patency: patent  Anesthetic complications: No anesthetic complications  PONV Status: none  Cardiovascular status: acceptable and stable  Respiratory status: acceptable  Hydration status: acceptable    Comments: An Anesthesiologist personally participated in the most demanding procedures (including induction and emergence if applicable) in the anesthesia plan, monitored the course of anesthesia administration at frequent intervals and remained physically present and available for immediate diagnosis and treatment of emergencies.

## 2023-01-19 NOTE — H&P
[facility]   UROLOGY HISTORY AND PHYSICAL    Patient Name: Ramesh Garcia  : 1955  MRN: [id number]  Primary Care Physician:  Sushant Stack MD  Date of admission: 2023    Subjective   Subjective     Chief Complaint:   Nephrolithiasis    HPI:    Ramesh Garcia is a 67 y.o. male   Nephrolithiasis  , Solitary right kidney    No changes to H&P    Review of Systems     10 systems reviewed and are negative other than what is listed in HPI    Personal History     Past Medical History:   Diagnosis Date   • Cancer of kidney (HCC)     LEFT/KIDNEY REMOVED   • Concussion    • GERD (gastroesophageal reflux disease)    • Hyperlipidemia    • Hypertension    • Hypothyroidism    • Kidney stones    • Sleep apnea     NO CPAP       Past Surgical History:   Procedure Laterality Date   • COLONOSCOPY N/A 2022    Procedure: COLONOSCOPY;  Surgeon: Maik Purcell MD;  Location: [facility] ENDOSCOPY;  Service: General;  Laterality: N/A;  DIVERTICULOSIS   • ELBOW PROCEDURE Left     TRAUMA WOUND   • KIDNEY STONE SURGERY      NURY LITHOTRIPSY AND ESWL 6 OR MORE   • KIDNEY SURGERY Left     removed  cancer   • LUMBAR DISC SURGERY      ?LEVEL   • NECK SURGERY  10/23/2015    ACDF C5-C6 and C6-C7-Dr. Suggs/FUSION PT HAS FULL EXTENSION OF NECK   • SHOULDER SURGERY      left  TRAUMA REPAIR, right  RCR       Family History: family history includes Breast cancer in his mother, paternal grandmother, and sister; Heart attack in his father; Hypertension in his father and mother. Otherwise pertinent FHx was reviewed and not pertinent to current issue.    Social History:  reports that he has quit smoking. He has quit using smokeless tobacco. He reports that he does not drink alcohol and does not use drugs.    Home Medications:  allopurinol, aspirin, carvedilol, choline fenofibrate, dilTIAZem CD, fish oil, hydroCHLOROthiazide, levothyroxine, multivitamin with minerals, and pantoprazole      Allergies:  No Known  Allergies    Objective   Objective     Vitals:   Temp:  [98.3 °F (36.8 °C)] 98.3 °F (36.8 °C)  Heart Rate:  [57] 57  Resp:  [18] 18  BP: (157)/(64) 157/64  Physical Exam    Constitutional: Awake, alert    Respiratory: Clear to auscultation bilaterally, nonlabored respirations    Cardiovascular: RRR, no murmurs, rubs, or gallops, palpable pedal pulses bilaterally   Gastrointestinal: Positive bowel sounds, soft, nontender, nondistended   Musculoskeletal: No bilateral ankle edema, no clubbing or cyanosis to extremities     Result Review    Result Review:  I have personally reviewed the results from the time of this admission to 1/19/2023 06:38 EST and agree with these findings:  []  Laboratory  []  Microbiology  []  Radiology  []  EKG/Telemetry   []  Cardiology/Vascular   []  Pathology  []  Old records  []  Other:    Assessment & Plan   Assessment / Plan     Brief Patient Summary:  Ramesh Garcia is a 67 y.o. male     Active Hospital Problems:  Active Hospital Problems    Diagnosis    • **Nephrolithiasis        Plan:     Cystoscopy with right ureteroscopy with laser and right ureteral stent placement.  Risks and benefits were discussed including bleeding, infection and damage to the urinary system.  We also discussed the risk of anesthesia up to and including death.  Patient voiced understanding and would like to proceed.    Electronically signed by Rony Rose MD, 01/19/23, 6:38 AM EST.

## 2023-01-19 NOTE — DISCHARGE INSTRUCTIONS
DISCHARGE INSTRUCTIONS  Ureteroscopy Lasertripsy      For your surgery you had:  General anesthesia (you may have a sore throat for the first 24 hours)     You may experience dizziness, drowsiness, or lightheadedness for several hours following surgery.  Do not stay alone today or tonight.  Limit your activity for 24 hours.  You should not drive or operate machinery, drink alcohol, or sign legally binding documents for 24 hours or while you are taking pain medication.  Resume your diet slowly.  Follow any special dietary instructions you may have been given by your doctor.     NOTIFY YOUR DOCTOR IF YOU EXPERIENCE ANY OF THE FOLLOWING:  Temperature greater than 101 degrees Fahrenheit  Shaking Chills  Redness or excessive drainage from incision  Nausea, vomiting and/or pain that is not controlled by prescribed medications  Increase in bleeding or bleeding that is excessive  Unable to urinate in 6 hours after surgery  If unable to reach your doctor, please go to the closest Emergency Room  Strain urine if instructed by physician.  Collect any fragments and take with you on your scheduled appointment. You may pass small blood clots.  Blood in your urine is normal.  It could be light pink to cherry color.  Drink 6-8 glasses of fluid each day to assist with passing of stone fragments.  Back pain is common.  It may feel like a dull ache or back spasm.  Urine will be bloody for several days.  Slight redness or bruising may be noticed on treated side.  If you have difficulty urinating, try sitting in a bathtub of warm water.    If you have a stent, it must be managed by your urologist.  Do NOT forget.  Medications per physician instructions as indicated on Discharge Medication Information Sheet.    Last dose of pain medication was given at:    Tylenol 1000mg @ 7:45, next @ 1:45 PM    SPECIAL INSTRUCTIONS:

## 2023-01-20 LAB
LAB AP CASE REPORT: NORMAL
LAB AP CLINICAL INFORMATION: NORMAL
PATH REPORT.FINAL DX SPEC: NORMAL
PATH REPORT.GROSS SPEC: NORMAL

## 2023-01-21 LAB — QT INTERVAL: 450 MS

## 2023-01-26 NOTE — PROGRESS NOTES
Cytoscopy with Stent Removal     Pre-Procedure Diagnosis: Nephrolithiasis    Post-Procedural Diagnosis: Same    Procedure Performed: Cystoscopy with Ureteral Stent Removal     Description of the Procedure:      was appropriately identified and brought to the cytoscopy suite. A timeout was undertaken documenting the correct patient, site, and procedure. The patient was prepped in a normal sterile fashion. A flexible cytoscope was then introduced into the bladder. The stent was identified and grasped with endoscopic graspers. The entire stent was removed and passed off the field. Patient tolerated the procedure well. There were no intraprocedural complications.        Assessment and Plan   Diagnoses and all orders for this visit:    1. Nephrolithiasis (Primary)          Current Outpatient Medications:   •  allopurinol (ZYLOPRIM) 300 MG tablet, Take 300 mg by mouth Every Night. FOR KIDNEY STONES, Disp: , Rfl:   •  aspirin 81 MG tablet, Take 81 mg by mouth Daily. LAST DOSE 1/11/23, Disp: , Rfl:   •  carvedilol (COREG) 12.5 MG tablet, Take 12.5 mg by mouth 2 (Two) Times a Day With Meals. INST PER ANESTHESIA PROTOCOL, Disp: , Rfl:   •  choline fenofibrate (TRILIPIX) 135 MG capsule, Take 135 mg by mouth Every Night., Disp: , Rfl:   •  diltiazem CD (CARDIZEM CD) 300 MG 24 hr capsule, Take 1 capsule by mouth Daily., Disp: , Rfl:   •  hydroCHLOROthiazide (HYDRODIURIL) 12.5 MG tablet, Take 12.5 mg by mouth Daily. INST PER ANESTHESIA PROTOCOL, Disp: , Rfl:   •  HYDROcodone-acetaminophen (NORCO) 5-325 MG per tablet, Take 1 tablet by mouth Every 6 (Six) Hours As Needed (Pain)., Disp: 12 tablet, Rfl: 0  •  levothyroxine (SYNTHROID, LEVOTHROID) 137 MCG tablet, Take 1 tablet by mouth Daily., Disp: , Rfl:   •  multivitamin with minerals tablet tablet, Take 1 tablet by mouth Daily. LAST DOSE 1/16/23, Disp: , Rfl:   •  Omega-3 Fatty Acids (fish oil) 1000 MG capsule capsule, Take  by mouth Daily With Breakfast. LAST DOSE 1/11/23, Disp:  , Rfl:   •  pantoprazole (PROTONIX) 40 MG EC tablet, Take 1 tablet by mouth Daily., Disp: , Rfl:       Electronically Signed by: Rony Rose MD on 01/27/2023

## 2023-01-27 ENCOUNTER — PROCEDURE VISIT (OUTPATIENT)
Dept: UROLOGY | Facility: CLINIC | Age: 68
End: 2023-01-27
Payer: MEDICARE

## 2023-01-27 DIAGNOSIS — N20.0 NEPHROLITHIASIS: Primary | ICD-10-CM

## 2023-01-27 PROCEDURE — 52310 CYSTOSCOPY AND TREATMENT: CPT | Performed by: UROLOGY

## 2023-01-28 LAB
COLOR STONE: NORMAL
COM MFR STONE: 100 %
COMPN STONE: NORMAL
LABORATORY COMMENT REPORT: NORMAL
LABORATORY COMMENT REPORT: NORMAL
Lab: NORMAL
Lab: NORMAL
PHOTO: NORMAL
SIZE STONE: NORMAL MM
SPEC SOURCE SUBJ: NORMAL
WT STONE: 241 MG

## 2023-02-07 ENCOUNTER — HOSPITAL ENCOUNTER (OUTPATIENT)
Dept: ULTRASOUND IMAGING | Facility: HOSPITAL | Age: 68
Discharge: HOME | End: 2023-02-07
Admitting: UROLOGY
Payer: MEDICARE

## 2023-02-07 DIAGNOSIS — N20.0 NEPHROLITHIASIS: ICD-10-CM

## 2023-02-07 PROCEDURE — 76775 US EXAM ABDO BACK WALL LIM: CPT

## 2023-02-25 NOTE — PROGRESS NOTES
Chief Complaint: Urologic complaint    Subjective         History of Present Illness  Ramesh Garcia is a 67 y.o. male       Solitary right kidney  Nephrolithiasis        No Pain or blood currently.    2/23 renal ultrasound-negative, no left kidney    Stent removed in office    1/19/2023   right ureteroscopy-stent no string -all stones removed.      Patient has never passed stones, 8 lithotripsies several ESWL's    currently on allopurinol 300 mg daily.  Pl[facility] by [facility] he has been on this for few years.  Patient does think this is helping he does think he is have less stones since being on this medication        Previous      11/9/2022 CT abdomen/pelvis without - prior left nephrectomy, 1.2 cm nonobstructing stone lower pole right kidney.  Significantly larger than it was on scan from 2011.  Images reviewed.     6/22 1.1, GFR 68             12/17 calcium oxalate dihydrate 20, calcium oxalate monohydrate 80      On allopurinol for stone -history of uric acid nephrolithiasis    5/18 [facility] - uric acid levels down on allopurinol, urine oxalate decreased, urine calcium elevated.    No urologic family history    No cardiopulmonary history.  Non-smoker.  ASA 81      2006 left nephrectomy renal cell carcinoma [facility]      PSA    7/22   0.98       1/21   0.9  3/19   0.9  9/15   0.7                    Objective     Past Medical History:   Diagnosis Date   • Cancer of kidney (HCC)     LEFT/KIDNEY REMOVED   • Concussion    • GERD (gastroesophageal reflux disease)    • Hyperlipidemia    • Hypertension    • Hypothyroidism    • Kidney stones    • Sleep apnea     NO CPAP       Past Surgical History:   Procedure Laterality Date   • COLONOSCOPY N/A 11/14/2022    Procedure: COLONOSCOPY;  Surgeon: Maik Purcell MD;  Location: [facility] ENDOSCOPY;  Service: General;  Laterality: N/A;  DIVERTICULOSIS   • CYSTOSCOPY URETEROSCOPY Right 1/19/2023    Procedure: cystoscopy with right ureteroscopy with basket  stone extraction and right ureteral stent placement.;  Surgeon: Rony Rose MD;  Location: [facility] MAIN OR;  Service: Urology;  Laterality: Right;   • ELBOW PROCEDURE Left     TRAUMA WOUND   • KIDNEY STONE SURGERY      NURY LITHOTRIPSY AND ESWL 6 OR MORE   • KIDNEY SURGERY Left 2006    removed 2006 cancer   • LUMBAR DISC SURGERY      ?LEVEL   • NECK SURGERY  10/23/2015    ACDF C5-C6 and C6-C7-DrSara SalazarIfeanyi/FUSION PT HAS FULL EXTENSION OF NECK   • SHOULDER SURGERY      left 2010 TRAUMA REPAIR, right 2012 RCR         Current Outpatient Medications:   •  allopurinol (ZYLOPRIM) 300 MG tablet, Take 300 mg by mouth Every Night. FOR KIDNEY STONES, Disp: , Rfl:   •  aspirin 81 MG tablet, Take 81 mg by mouth Daily. LAST DOSE 1/11/23, Disp: , Rfl:   •  carvedilol (COREG) 12.5 MG tablet, Take 12.5 mg by mouth 2 (Two) Times a Day With Meals. INST PER ANESTHESIA PROTOCOL, Disp: , Rfl:   •  choline fenofibrate (TRILIPIX) 135 MG capsule, Take 135 mg by mouth Every Night., Disp: , Rfl:   •  diltiazem CD (CARDIZEM CD) 300 MG 24 hr capsule, Take 1 capsule by mouth Daily., Disp: , Rfl:   •  hydroCHLOROthiazide (HYDRODIURIL) 12.5 MG tablet, Take 12.5 mg by mouth Daily. INST PER ANESTHESIA PROTOCOL, Disp: , Rfl:   •  HYDROcodone-acetaminophen (NORCO) 5-325 MG per tablet, Take 1 tablet by mouth Every 6 (Six) Hours As Needed (Pain)., Disp: 12 tablet, Rfl: 0  •  levothyroxine (SYNTHROID, LEVOTHROID) 137 MCG tablet, Take 1 tablet by mouth Daily., Disp: , Rfl:   •  multivitamin with minerals tablet tablet, Take 1 tablet by mouth Daily. LAST DOSE 1/16/23, Disp: , Rfl:   •  Omega-3 Fatty Acids (fish oil) 1000 MG capsule capsule, Take  by mouth Daily With Breakfast. LAST DOSE 1/11/23, Disp: , Rfl:   •  pantoprazole (PROTONIX) 40 MG EC tablet, Take 1 tablet by mouth Daily., Disp: , Rfl:     No Known Allergies     Family History   Problem Relation Age of Onset   • Hypertension Mother    • Breast cancer Mother    • Hypertension Father    •  Heart attack Father    • Breast cancer Sister    • Breast cancer Paternal Grandmother    • Malig Hyperthermia Neg Hx        Social History     Socioeconomic History   • Marital status:    Tobacco Use   • Smoking status: Former   • Smokeless tobacco: Former   • Tobacco comments:     Quit- over 30 years ago   Vaping Use   • Vaping Use: Never used   Substance and Sexual Activity   • Alcohol use: No   • Drug use: Never   • Sexual activity: Defer       Vital Signs:   There were no vitals taken for this visit.         Results for orders placed or performed during the hospital encounter of 01/19/23   Basic Metabolic Panel    Specimen: Blood   Result Value Ref Range    Glucose 114 (H) 65 - 99 mg/dL    BUN 27 (H) 8 - 23 mg/dL    Creatinine 1.25 0.76 - 1.27 mg/dL    Sodium 136 136 - 145 mmol/L    Potassium 3.8 3.5 - 5.2 mmol/L    Chloride 100 98 - 107 mmol/L    CO2 24.1 22.0 - 29.0 mmol/L    Calcium 9.5 8.6 - 10.5 mg/dL    BUN/Creatinine Ratio 21.6 7.0 - 25.0    Anion Gap 11.9 5.0 - 15.0 mmol/L    eGFR 62.7 >60.0 mL/min/1.73   STONE ANALYSIS - Calculus, Ureter, Right    Specimen: Ureter, Right; Calculus   Result Value Ref Range    Stone Source Comment     Color Brown     Size 3x3 mm    Stone Weight 241 mg    Composition Comment     Ca Oxalate - Monohydrate, Stone 100 %    Comment Comment     Photo Comment     Comments: Comment     Please note Comment     Disclaimer: Comment    ECG 12 Lead Pre-Op / Pre-Procedure   Result Value Ref Range    QT Interval 450 ms   Tissue Pathology Exam    Specimen: Ureter, Right; Calculus   Result Value Ref Range    Case Report       Surgical Pathology Report                         Case: [id number]                                  Authorizing Provider:  Rony Rose MD      Collected:           01/19/2023 08:57 AM          Ordering Location:     [facility] MAIN Received:            01/19/2023 10:20 AM                                 OR                                        "                                    Pathologist:           Rachell Lara MD                                                     Specimen:    Ureter, Right, right ureteral stone                                                        Clinical Information       Nephrolithiasis      Final Diagnosis       Right ureteral stone, removal:   - Stones, sent for chemical analysis (gross only diagnosis)         Gross Description       1. Ureter, Right.  The specimen is received fresh labeled \" right ureteral stone\" and consists of a 1.0 x 0.7 x 0.5 cm aggregate of brown, friable renal stones.  The specimen is submitted en toto for chemical analysis following gross examination by staff pathologist Rachell Lara. SORAYA                   Assessment and Plan    Diagnoses and all orders for this visit:    1. Nephrolithiasis (Primary)    2. Solitary kidney, acquired          Continue allopurinol 300 mg daily        The patient was counseled on the preventative measures of kidney stones today.  This included increasing fluid intake to make at least 1.5 ml daily, decreasing meat intake, decreasing salt intake and taking in a normal amount of calcium (1000 mg daily).  Information handout given on this today.       We also discussed the DASH diet today for stone prevention and handout was given        Follow-up in 3 months 24-hour urine and UA with micro       "

## 2023-02-28 ENCOUNTER — OFFICE VISIT (OUTPATIENT)
Dept: UROLOGY | Facility: CLINIC | Age: 68
End: 2023-02-28
Payer: MEDICARE

## 2023-02-28 VITALS — WEIGHT: 221 LBS | HEIGHT: 71 IN | BODY MASS INDEX: 30.94 KG/M2

## 2023-02-28 DIAGNOSIS — Z90.5 SOLITARY KIDNEY, ACQUIRED: ICD-10-CM

## 2023-02-28 DIAGNOSIS — N20.0 NEPHROLITHIASIS: Primary | ICD-10-CM

## 2023-02-28 PROCEDURE — 99213 OFFICE O/P EST LOW 20 MIN: CPT | Performed by: UROLOGY

## 2023-06-01 ENCOUNTER — LAB (OUTPATIENT)
Dept: LAB | Facility: HOSPITAL | Age: 68
End: 2023-06-01
Payer: MEDICARE

## 2023-06-01 ENCOUNTER — TELEPHONE (OUTPATIENT)
Dept: UROLOGY | Facility: CLINIC | Age: 68
End: 2023-06-01

## 2023-06-01 DIAGNOSIS — N20.0 NEPHROLITHIASIS: ICD-10-CM

## 2023-06-01 DIAGNOSIS — Z90.5 SOLITARY KIDNEY, ACQUIRED: ICD-10-CM

## 2023-06-01 LAB
BACTERIA UR QL AUTO: NORMAL /HPF
BILIRUB UR QL STRIP: NEGATIVE
CLARITY UR: CLEAR
COLOR UR: YELLOW
GLUCOSE UR STRIP-MCNC: NEGATIVE MG/DL
HGB UR QL STRIP.AUTO: NEGATIVE
HYALINE CASTS UR QL AUTO: NORMAL /LPF
KETONES UR QL STRIP: NEGATIVE
LEUKOCYTE ESTERASE UR QL STRIP.AUTO: NEGATIVE
NITRITE UR QL STRIP: NEGATIVE
PH UR STRIP.AUTO: 7 [PH] (ref 5–8)
PROT UR QL STRIP: ABNORMAL
RBC # UR STRIP: NORMAL /HPF
REF LAB TEST METHOD: NORMAL
SP GR UR STRIP: 1.02 (ref 1–1.03)
SQUAMOUS #/AREA URNS HPF: NORMAL /HPF
UROBILINOGEN UR QL STRIP: ABNORMAL
WBC # UR STRIP: NORMAL /HPF

## 2023-06-01 PROCEDURE — 81001 URINALYSIS AUTO W/SCOPE: CPT

## 2023-06-01 NOTE — TELEPHONE ENCOUNTER
Called to remind pt to do UA with Micro prior to appt 6/6. Order is in. He said he will go do this

## 2023-06-03 NOTE — PROGRESS NOTES
Chief Complaint: Urologic complaint    Subjective         History of Present Illness  Ramesh Garcia is a 67 y.o. male       Solitary right kidney  Nephrolithiasis        Follows up today with 24-hour    6/1/2023 UA-negative, urine pH 7.0    Patient had all the stones in the last decade, less since he has been on been on allopurinol      Patient has never passed stones, 8 lithotripsies several ESWL's      On 12.5 HCTZ daily for BP      Previous    2/23 renal ultrasound-negative, no left kidney    Stent removed in office    1/19/2023   right ureteroscopy-stent no string -all stones removed.      11/9/2022 CT abdomen/pelvis without - prior left nephrectomy, 1.2 cm nonobstructing stone lower pole right kidney.  Significantly larger than it was on scan from 2011.  Images reviewed.     6/22 1.1, GFR 68             12/17 calcium oxalate dihydrate 20, calcium oxalate monohydrate 80      On allopurinol for stone -history of uric acid nephrolithiasis    5/18 [facility] - uric acid levels down on allopurinol, urine oxalate decreased, urine calcium elevated.    No urologic family history    No cardiopulmonary history.  Non-smoker.  ASA 81      2006 left nephrectomy renal cell carcinoma [facility]      PSA    7/22   0.98       1/21   0.9  3/19   0.9  9/15   0.7            Objective     Past Medical History:   Diagnosis Date    Cancer of kidney     LEFT/KIDNEY REMOVED    Concussion     GERD (gastroesophageal reflux disease)     Hyperlipidemia     Hypertension     Hypothyroidism     Kidney stones     Sleep apnea     NO CPAP       Past Surgical History:   Procedure Laterality Date    COLONOSCOPY N/A 11/14/2022    Procedure: COLONOSCOPY;  Surgeon: Maik Purcell MD;  Location: [facility] ENDOSCOPY;  Service: General;  Laterality: N/A;  DIVERTICULOSIS    CYSTOSCOPY URETEROSCOPY Right 1/19/2023    Procedure: cystoscopy with right ureteroscopy with basket stone extraction and right ureteral stent placement.;  Surgeon: Milton  Rony BENEDICT MD;  Location: [facility] MAIN OR;  Service: Urology;  Laterality: Right;    ELBOW PROCEDURE Left     TRAUMA WOUND    KIDNEY STONE SURGERY      NURY LITHOTRIPSY AND ESWL 6 OR MORE    KIDNEY SURGERY Left 2006    removed 2006 cancer    LUMBAR DISC SURGERY      ?LEVEL    NECK SURGERY  10/23/2015    ACDF C5-C6 and C6-C7-Dr. Suggs/FUSION PT HAS FULL EXTENSION OF NECK    SHOULDER SURGERY      left 2010 TRAUMA REPAIR, right 2012 RCR         Current Outpatient Medications:     allopurinol (ZYLOPRIM) 300 MG tablet, Take 1 tablet by mouth Every Night. FOR KIDNEY STONES, Disp: , Rfl:     aspirin 81 MG tablet, Take 1 tablet by mouth Daily. LAST DOSE 1/11/23, Disp: , Rfl:     carvedilol (COREG) 12.5 MG tablet, Take 1 tablet by mouth 2 (Two) Times a Day With Meals. INST PER ANESTHESIA PROTOCOL, Disp: , Rfl:     choline fenofibrate (TRILIPIX) 135 MG capsule, Take 1 capsule by mouth Every Night., Disp: , Rfl:     diltiazem CD (CARDIZEM CD) 300 MG 24 hr capsule, Take 1 capsule by mouth Daily., Disp: , Rfl:     hydroCHLOROthiazide (HYDRODIURIL) 12.5 MG tablet, Take 1 tablet by mouth Daily. INST PER ANESTHESIA PROTOCOL, Disp: , Rfl:     HYDROcodone-acetaminophen (NORCO) 5-325 MG per tablet, Take 1 tablet by mouth Every 6 (Six) Hours As Needed (Pain)., Disp: 12 tablet, Rfl: 0    levothyroxine (SYNTHROID, LEVOTHROID) 137 MCG tablet, Take 1 tablet by mouth Daily., Disp: , Rfl:     multivitamin with minerals tablet tablet, Take 1 tablet by mouth Daily. LAST DOSE 1/16/23, Disp: , Rfl:     Omega-3 Fatty Acids (fish oil) 1000 MG capsule capsule, Take  by mouth Daily With Breakfast. LAST DOSE 1/11/23, Disp: , Rfl:     pantoprazole (PROTONIX) 40 MG EC tablet, Take 1 tablet by mouth Daily., Disp: , Rfl:     No Known Allergies     Family History   Problem Relation Age of Onset    Hypertension Mother     Breast cancer Mother     Hypertension Father     Heart attack Father     Breast cancer Sister     Breast cancer Paternal Grandmother      Malig Hyperthermia Neg Hx        Social History     Socioeconomic History    Marital status:    Tobacco Use    Smoking status: Former    Smokeless tobacco: Former    Tobacco comments:     Quit- over 30 years ago   Vaping Use    Vaping Use: Never used   Substance and Sexual Activity    Alcohol use: No    Drug use: Never    Sexual activity: Defer       Vital Signs:   There were no vitals taken for this visit.              Assessment and Plan    Diagnoses and all orders for this visit:    1. Solitary kidney, acquired (Primary)    2. Nephrolithiasis          Continue allopurinol 300 mg daily.  Being prescribed through PCP      24-hour urine reviewed, look good other than a citrate of 353, we did discuss risk and benefits of placing him on potassium citrate.  At this point we will hold off on medication does not really want to be on further meds at this time.      Patient with uric acid kidney stones, cannot be seen on KUB so at this time we will do no further follow-up unless he is having trouble.  Follow-up with PCP

## 2023-06-06 ENCOUNTER — OFFICE VISIT (OUTPATIENT)
Dept: UROLOGY | Facility: CLINIC | Age: 68
End: 2023-06-06
Payer: MEDICARE

## 2023-06-06 VITALS — RESPIRATION RATE: 19 BRPM

## 2023-06-06 DIAGNOSIS — N20.0 NEPHROLITHIASIS: ICD-10-CM

## 2023-06-06 DIAGNOSIS — Z90.5 SOLITARY KIDNEY, ACQUIRED: Primary | ICD-10-CM

## 2023-10-18 ENCOUNTER — TRANSCRIBE ORDERS (OUTPATIENT)
Dept: ADMINISTRATIVE | Facility: HOSPITAL | Age: 68
End: 2023-10-18
Payer: MEDICARE

## 2023-10-18 DIAGNOSIS — M54.50 CHRONIC BILATERAL LOW BACK PAIN, UNSPECIFIED WHETHER SCIATICA PRESENT: Primary | ICD-10-CM

## 2023-10-18 DIAGNOSIS — G89.29 CHRONIC BILATERAL LOW BACK PAIN, UNSPECIFIED WHETHER SCIATICA PRESENT: Primary | ICD-10-CM

## 2023-11-03 ENCOUNTER — HOSPITAL ENCOUNTER (OUTPATIENT)
Dept: MRI IMAGING | Facility: HOSPITAL | Age: 68
Discharge: HOME | End: 2023-11-03
Admitting: FAMILY MEDICINE
Payer: MEDICARE

## 2023-11-03 DIAGNOSIS — G89.29 CHRONIC BILATERAL LOW BACK PAIN, UNSPECIFIED WHETHER SCIATICA PRESENT: ICD-10-CM

## 2023-11-03 DIAGNOSIS — M54.50 CHRONIC BILATERAL LOW BACK PAIN, UNSPECIFIED WHETHER SCIATICA PRESENT: ICD-10-CM

## 2023-11-03 PROCEDURE — 72148 MRI LUMBAR SPINE W/O DYE: CPT

## 2023-11-30 ENCOUNTER — PATIENT ROUNDING (BHMG ONLY) (OUTPATIENT)
Dept: NEUROSURGERY | Facility: CLINIC | Age: 68
End: 2023-11-30
Payer: MEDICARE

## 2023-11-30 ENCOUNTER — OFFICE VISIT (OUTPATIENT)
Dept: NEUROSURGERY | Facility: CLINIC | Age: 68
End: 2023-11-30
Payer: MEDICARE

## 2023-11-30 VITALS
BODY MASS INDEX: 29.82 KG/M2 | HEIGHT: 71 IN | HEART RATE: 58 BPM | DIASTOLIC BLOOD PRESSURE: 73 MMHG | SYSTOLIC BLOOD PRESSURE: 140 MMHG | WEIGHT: 213 LBS

## 2023-11-30 DIAGNOSIS — M48.061 FORAMINAL STENOSIS OF LUMBAR REGION: ICD-10-CM

## 2023-11-30 DIAGNOSIS — G89.29 CHRONIC MIDLINE LOW BACK PAIN WITH BILATERAL SCIATICA: ICD-10-CM

## 2023-11-30 DIAGNOSIS — M54.41 CHRONIC MIDLINE LOW BACK PAIN WITH BILATERAL SCIATICA: ICD-10-CM

## 2023-11-30 DIAGNOSIS — M47.816 FACET ARTHROPATHY, LUMBAR: ICD-10-CM

## 2023-11-30 DIAGNOSIS — M54.42 CHRONIC MIDLINE LOW BACK PAIN WITH BILATERAL SCIATICA: ICD-10-CM

## 2023-11-30 DIAGNOSIS — M51.26 HERNIATED NUCLEUS PULPOSUS, L4-5 RIGHT: ICD-10-CM

## 2023-11-30 DIAGNOSIS — M51.27 HERNIATED NUCLEUS PULPOSUS, L5-S1: Primary | ICD-10-CM

## 2023-11-30 NOTE — PROGRESS NOTES
"Chief Complaint  Back Pain, Hip Pain (Bialteral ), Leg Pain (Bilateral back to above knees), and Numbness (Bilateral feet )    Subjective          Ramesh Garcia who is a 68 y.o. year old male who presents to [facility] for Evaluation of the Spine.     The patient complains of pain located in the Lumbar Spine.  Patients states the pain has been present for 2 years.  The pain came on gradually.  The pain scaled level is 4.  The pain does radiate. Dermatomes are located bilaterally Lumbar at: not below the knee..  The pain is constant and waxing/waning and described as aching.  The pain is worse at no particular time of day. Patient states  laying back makes the pain better.  Patient states Prolonged Standing, Prolonged Sitting, and Prolonged Walking makes the pain worse.    Associated Symptoms Include: Numbness and Tingling in the legs to the feet. He denies loss of bowel or bladder control.  Conservative Interventions Include: Pain Medications that were ineffective. Chiropractor was not very effective.    Was this the result of an injury or accident? : No    History of Previous Spinal Surgery?: Yes.  Cervical, Date in the last 10 years, C3-C4 and Lumbar, Date in the last 10 years, unknown level.     reports that he has quit smoking. He has quit using smokeless tobacco.    Review of Systems   Musculoskeletal:  Positive for back pain.        Objective   Vital Signs:   /73 (BP Location: Right arm, Patient Position: Sitting, Cuff Size: Adult)   Pulse 58   Ht 180.3 cm (71\")   Wt 96.6 kg (213 lb)   BMI 29.71 kg/m²       Physical Exam  Constitutional:       Appearance: Normal appearance.   Pulmonary:      Effort: Pulmonary effort is normal.   Musculoskeletal:         General: No tenderness.      Comments: SLR bilaterally causes pain in the lower back and right hip   Neurological:      General: No focal deficit present.      Mental Status: He is alert and oriented to " person, place, and time.      Sensory: No sensory deficit.      Motor: No weakness.      Deep Tendon Reflexes: Reflexes normal.   Psychiatric:         Mood and Affect: Mood normal.         Behavior: Behavior normal.        Neurologic Exam     Mental Status   Oriented to person, place, and time.        Result Review     I have personally reviewed the MRI of lumbar spine without contrast from 11/3/2023 which shows large disc bulging at L5-S1 which contributes to moderate bilateral foraminal stenosis and no significant central canal narrowing.  There is moderate right foraminal narrowing at L4-L5.  There is multilevel facet arthritis.     Assessment and Plan    Diagnoses and all orders for this visit:    1. Herniated nucleus pulposus, L5-S1 (Primary)  -     Ambulatory Referral to Pain Management    2. Herniated nucleus pulposus, L4-5 right  -     Ambulatory Referral to Pain Management    3. Foraminal stenosis of lumbar region  -     Ambulatory Referral to Pain Management    4. Facet arthropathy, lumbar  -     Ambulatory Referral to Pain Management    5. Chronic midline low back pain with bilateral sciatica  -     Ambulatory Referral to Pain Management    He has pain in the back and into the thighs.    He does have some foraminal narrowing at L5-S1 bilaterally and on the right at L4-L5. We discussed that surgery for this change could potential help leg pain, but is less likely to help pain that does not go to the toes matching these nerves.    He may consider LESB vs MBBs/RFA. I will refer him to [facility] in [place] to discuss this treatment.    The patient was counseled on basic recommendations for the reduction and prevention of back, neck, or spine pain in association with spinal disorders, including: cessation/avoidance of nicotine use, maintenance of a healthy BMI and weight, focusing on building/maintaining core strength through core exercise, and avoidance of activities which worsen the pain. The patient will  monitor for changes in symptoms and notify our clinic of these changes as needed.    He will follow-up here PRN.    Follow Up   Return if symptoms worsen or fail to improve.  Patient was given instructions and counseling regarding his condition or for health maintenance advice. Please see specific information pulled into the AVS if appropriate.

## 2024-02-22 ENCOUNTER — TRANSCRIBE ORDERS (OUTPATIENT)
Dept: ADMINISTRATIVE | Facility: HOSPITAL | Age: 69
End: 2024-02-22
Payer: MEDICARE

## 2024-02-22 DIAGNOSIS — R10.11 RUQ PAIN: Primary | ICD-10-CM

## 2024-03-01 ENCOUNTER — HOSPITAL ENCOUNTER (OUTPATIENT)
Dept: ULTRASOUND IMAGING | Facility: HOSPITAL | Age: 69
Discharge: HOME | End: 2024-03-01
Payer: MEDICARE

## 2024-03-01 DIAGNOSIS — R10.11 RUQ PAIN: ICD-10-CM

## 2024-03-01 PROCEDURE — 76705 ECHO EXAM OF ABDOMEN: CPT

## 2024-03-08 ENCOUNTER — TRANSCRIBE ORDERS (OUTPATIENT)
Dept: ADMINISTRATIVE | Facility: HOSPITAL | Age: 69
End: 2024-03-08
Payer: MEDICARE

## 2024-03-08 DIAGNOSIS — R10.11 RIGHT UPPER QUADRANT ABDOMINAL PAIN: Primary | ICD-10-CM

## 2024-03-08 DIAGNOSIS — K80.80 OTHER CHOLELITHIASIS WITHOUT OBSTRUCTION: ICD-10-CM

## 2024-03-29 ENCOUNTER — HOSPITAL ENCOUNTER (OUTPATIENT)
Dept: NUCLEAR MEDICINE | Facility: HOSPITAL | Age: 69
Discharge: HOME | End: 2024-03-29
Payer: MEDICARE

## 2024-03-29 DIAGNOSIS — R10.11 RIGHT UPPER QUADRANT ABDOMINAL PAIN: ICD-10-CM

## 2024-03-29 DIAGNOSIS — K80.80 OTHER CHOLELITHIASIS WITHOUT OBSTRUCTION: ICD-10-CM

## 2024-03-29 PROCEDURE — 78227 HEPATOBIL SYST IMAGE W/DRUG: CPT

## 2024-03-29 PROCEDURE — A9537 TC99M MEBROFENIN: HCPCS | Performed by: FAMILY MEDICINE

## 2024-03-29 PROCEDURE — 0 TECHNETIUM TC 99M MEBROFENIN KIT: Performed by: FAMILY MEDICINE

## 2024-03-29 RX ORDER — KIT FOR THE PREPARATION OF TECHNETIUM TC 99M MEBROFENIN 45 MG/10ML
1 INJECTION, POWDER, LYOPHILIZED, FOR SOLUTION INTRAVENOUS
Status: COMPLETED | OUTPATIENT
Start: 2024-03-29 | End: 2024-03-29

## 2024-03-29 RX ADMIN — KIT FOR THE PREPARATION OF TECHNETIUM TC 99M MEBROFENIN 1 DOSE: 45 INJECTION, POWDER, LYOPHILIZED, FOR SOLUTION INTRAVENOUS at 11:31

## 2024-04-03 ENCOUNTER — TRANSCRIBE ORDERS (OUTPATIENT)
Dept: ADMINISTRATIVE | Facility: HOSPITAL | Age: 69
End: 2024-04-03
Payer: MEDICARE

## 2024-04-03 DIAGNOSIS — R10.11 RIGHT UPPER QUADRANT PAIN: Primary | ICD-10-CM

## 2024-04-18 ENCOUNTER — TRANSCRIBE ORDERS (OUTPATIENT)
Dept: ADMINISTRATIVE | Facility: HOSPITAL | Age: 69
End: 2024-04-18
Payer: MEDICARE

## 2024-04-18 ENCOUNTER — HOSPITAL ENCOUNTER (OUTPATIENT)
Dept: CT IMAGING | Facility: HOSPITAL | Age: 69
Discharge: HOME | End: 2024-04-18
Admitting: FAMILY MEDICINE
Payer: MEDICARE

## 2024-04-18 DIAGNOSIS — R10.11 RIGHT UPPER QUADRANT PAIN: ICD-10-CM

## 2024-04-18 LAB
CREAT BLDA-MCNC: 1.3 MG/DL (ref 0.6–1.3)
EGFRCR SERPLBLD CKD-EPI 2021: 59.5 ML/MIN/1.73

## 2024-04-18 PROCEDURE — 25510000001 IOPAMIDOL PER 1 ML: Performed by: FAMILY MEDICINE

## 2024-04-18 PROCEDURE — 74177 CT ABD & PELVIS W/CONTRAST: CPT

## 2024-04-18 PROCEDURE — 82565 ASSAY OF CREATININE: CPT

## 2024-04-18 RX ADMIN — IOPAMIDOL 100 ML: 755 INJECTION, SOLUTION INTRAVENOUS at 12:15

## 2024-05-14 ENCOUNTER — OFFICE VISIT (OUTPATIENT)
Dept: SURGERY | Facility: CLINIC | Age: 69
End: 2024-05-14
Payer: MEDICARE

## 2024-05-14 VITALS — BODY MASS INDEX: 30.8 KG/M2 | WEIGHT: 220 LBS | HEIGHT: 71 IN | RESPIRATION RATE: 16 BRPM

## 2024-05-14 DIAGNOSIS — K80.20 CALCULUS OF GALLBLADDER WITHOUT CHOLECYSTITIS WITHOUT OBSTRUCTION: Primary | ICD-10-CM

## 2024-05-14 PROCEDURE — 99203 OFFICE O/P NEW LOW 30 MIN: CPT | Performed by: SURGERY

## 2024-05-14 PROCEDURE — 1160F RVW MEDS BY RX/DR IN RCRD: CPT | Performed by: SURGERY

## 2024-05-14 PROCEDURE — 1159F MED LIST DOCD IN RCRD: CPT | Performed by: SURGERY

## 2024-05-14 RX ORDER — SODIUM CHLORIDE, SODIUM LACTATE, POTASSIUM CHLORIDE, CALCIUM CHLORIDE 600; 310; 30; 20 MG/100ML; MG/100ML; MG/100ML; MG/100ML
70 INJECTION, SOLUTION INTRAVENOUS CONTINUOUS
OUTPATIENT
Start: 2024-05-14

## 2024-05-14 RX ORDER — GUANFACINE 1 MG/1
TABLET ORAL
COMMUNITY

## 2024-05-14 RX ORDER — FENOFIBRATE 160 MG/1
1 TABLET ORAL DAILY
COMMUNITY

## 2024-05-14 RX ORDER — SODIUM CHLORIDE 0.9 % (FLUSH) 0.9 %
10 SYRINGE (ML) INJECTION AS NEEDED
OUTPATIENT
Start: 2024-05-14

## 2024-05-14 RX ORDER — CARVEDILOL 25 MG/1
TABLET ORAL
COMMUNITY
Start: 2024-03-07

## 2024-05-14 RX ORDER — FAMOTIDINE 20 MG/1
TABLET, FILM COATED ORAL
COMMUNITY
Start: 2024-03-07

## 2024-05-14 RX ORDER — INDOCYANINE GREEN AND WATER 25 MG
1.25 KIT INJECTION ONCE
OUTPATIENT
Start: 2024-05-14 | End: 2024-05-14

## 2024-05-14 RX ORDER — SODIUM CHLORIDE 9 MG/ML
40 INJECTION, SOLUTION INTRAVENOUS AS NEEDED
OUTPATIENT
Start: 2024-05-14

## 2024-05-14 RX ORDER — SODIUM CHLORIDE 0.9 % (FLUSH) 0.9 %
10 SYRINGE (ML) INJECTION EVERY 12 HOURS SCHEDULED
OUTPATIENT
Start: 2024-05-14

## 2024-05-14 RX ORDER — DILTIAZEM HYDROCHLORIDE 300 MG/1
CAPSULE, EXTENDED RELEASE ORAL
COMMUNITY
Start: 2024-03-07

## 2024-05-14 RX ORDER — ONDANSETRON 2 MG/ML
4 INJECTION INTRAMUSCULAR; INTRAVENOUS EVERY 6 HOURS PRN
OUTPATIENT
Start: 2024-05-14

## 2024-05-14 NOTE — PROGRESS NOTES
Inpatient History and Physical Surgical Orders    Preadmission Location:   Preadmission Time:  Facility:  Surgery Date:  Surgery Time:  Preadmission Test date:     Chief Complaint  Outpatient History and Physical / Surgical Orders    Primary Care Provider: Sushant Stack MD    Referring Provider: Sushant Stack MD    Subjective      Patient Name: Ramesh Garcia : 1955    HPI  The patient is a 68-year-old gentleman who presents with symptomatic cholelithiasis.  He has been having fairly typical biliary colic pain and recently had a CT scan that showed multiple gallstones.    Past History:  Medical History: has a past medical history of Cancer of kidney, Concussion, GERD (gastroesophageal reflux disease), Hyperlipidemia, Hypertension, Hypothyroidism, Kidney stones, and Sleep apnea.   Surgical History: has a past surgical history that includes Elbow surgery (Left); Kidney surgery (Left, ); Neck surgery (10/23/2015); Shoulder surgery; Colonoscopy (N/A, 2022); Lumbar disc surgery; Kidney stone surgery; and cystoscopy ureteroscopy (Right, 2023).   Family History: family history includes Breast cancer in his mother, paternal grandmother, and sister; Heart attack in his father; Hypertension in his father and mother.   Social History: reports that he has quit smoking. He has quit using smokeless tobacco. He reports that he does not drink alcohol and does not use drugs.  Allergies: Patient has no known allergies.       Current Outpatient Medications:     allopurinol (ZYLOPRIM) 300 MG tablet, Take 1 tablet by mouth Every Night. FOR KIDNEY STONES, Disp: , Rfl:     Aspirin 81 MG capsule, Take 1 capsule every day by oral route as directed., Disp: , Rfl:     aspirin 81 MG tablet, Take 1 tablet by mouth Daily. LAST DOSE 23, Disp: , Rfl:     carvedilol (COREG) 12.5 MG tablet, Take 1 tablet by mouth 2 (Two) Times a Day With Meals. INST PER ANESTHESIA PROTOCOL, Disp: , Rfl:     carvedilol (COREG) 25  "MG tablet, , Disp: , Rfl:     choline fenofibrate (TRILIPIX) 135 MG capsule, Take 1 capsule by mouth Every Night., Disp: , Rfl:     dilTIAZem (TIAZAC) 300 MG 24 hr capsule, , Disp: , Rfl:     diltiazem CD (CARDIZEM CD) 300 MG 24 hr capsule, Take 1 capsule by mouth Daily., Disp: , Rfl:     famotidine (PEPCID) 20 MG tablet, , Disp: , Rfl:     fenofibrate 160 MG tablet, Take 1 tablet by mouth Daily., Disp: , Rfl:     guanFACINE (TENEX) 1 MG tablet, , Disp: , Rfl:     hydroCHLOROthiazide (HYDRODIURIL) 12.5 MG tablet, Take 1 tablet by mouth Daily. INST PER ANESTHESIA PROTOCOL, Disp: , Rfl:     levothyroxine (SYNTHROID, LEVOTHROID) 137 MCG tablet, Take 1 tablet by mouth Daily., Disp: , Rfl:     multivitamin with minerals tablet tablet, Take 1 tablet by mouth Daily. LAST DOSE 1/16/23, Disp: , Rfl:     Omega-3 Fatty Acids (fish oil) 1000 MG capsule capsule, Take  by mouth Daily With Breakfast. LAST DOSE 1/11/23, Disp: , Rfl:     pantoprazole (PROTONIX) 40 MG EC tablet, Take 1 tablet by mouth Daily., Disp: , Rfl:        Objective   Vital Signs:   Resp 16   Ht 180.3 cm (70.98\")   Wt 99.8 kg (220 lb)   BMI 30.70 kg/m²       Physical Exam  Vitals and nursing note reviewed.   Constitutional:       Appearance: Normal appearance. The patient is well-developed.   Cardiovascular:      Rate and Rhythm: Normal rate and regular rhythm.   Pulmonary:      Effort: Pulmonary effort is normal.      Breath sounds: Normal air entry.   Abdominal:      General: Bowel sounds are normal.      Palpations: Abdomen is soft.      Skin:     General: Skin is warm and dry.   Neurological:      Mental Status: The patient is alert and oriented to person, place, and time.      Motor: Motor function is intact.   Psychiatric:         Mood and Affect: Mood normal.       Result Review :               Assessment and Plan   Diagnoses and all orders for this visit:    1. Calculus of gallbladder without cholecystitis without obstruction (Primary)  -     Case " Request; Standing  -     Follow Anesthesia Guidelines / Protocol; Standing  -     Verify NPO Status; Standing  -     Obtain Informed Consent; Standing  -     Verify / Perform Chlorhexidine Skin Prep; Standing  -     Insert Peripheral IV; Standing  -     Saline Lock & Maintain IV Access; Standing  -     sodium chloride 0.9 % flush 10 mL  -     sodium chloride 0.9 % flush 10 mL  -     sodium chloride 0.9 % infusion 40 mL  -     lactated ringers infusion  -     Place Sequential Compression Device; Standing  -     Maintain Sequential Compression Device; Standing  -     ondansetron (ZOFRAN) injection 4 mg  -     indocyanine green (IC-GREEN) injection 1.25 mg  -     Case Request    We will schedule him for a robotic cholecystectomy.  I described the procedure to him as well as the risk and benefits and he is agreeable to proceeding.    I  Maik Purcell MD  05/14/2024

## 2024-06-10 NOTE — PRE-PROCEDURE INSTRUCTIONS
IMPORTANT INSTRUCTIONS - PRE-ADMISSION TESTING  DO NOT EAT OR CHEW anything after midnight the night before your procedure.    You may have SIPS NO RED LESS THEN 8 OZ CLEAR liquids up to __2____ hours prior to ARRIVAL time.  Take the following medications the morning of your procedure with JUST A SIP OF WATER:  __CARVEDILOL, DILTIAZEM, LEVOTHYROXINE, PANTOPRAZOLE_____________________________________________________________________________________________________________________________________________________________________________________    DO NOT BRING your medications to the hospital with you, UNLESS something has changed since your PRE-Admission Testing appointment.  Hold all vitamins, supplements, and NSAIDS (Non- steroidal anti-inflammatory meds) for one week prior to surgery (you MAY take Tylenol or Acetaminophen).  If you are diabetic, check your blood sugar the morning of your procedure. If it is less than 70 or if you are feeling symptomatic, call the following number for further instructions: [phone]_.  Use your inhalers/nebulizers as usual, the morning of your procedure. BRING YOUR INHALERS with you.   Bring your CPAP or BIPAP to hospital, ONLY IF YOU WILL BE SPENDING THE NIGHT.   Make sure you have a ride home and have someone who will stay with you the day of your procedure after you go home.  If you have any questions, please call your Pre-Admission Testing Nurse, ___CAROLINE_____________ at [phone]____________.   Per anesthesia request, do not smoke for 24 hours before your procedure or as instructed by your surgeon.    WILL CALL ON DAY BEFORE NORMALLY BETWEEN 1 AND 4 PM TO GIVE OFFICIAL ARRIVAL TIME FOR DAY OF SURGERY.    BATHING INSTRUCTIONS GIVEN. NO JEWELRY OF ANY TYPE   [facility]  IF AFTER HOURS OR WEEKEND AND NEED TO GET A HOLD OF AREA OF SURGERY IF NO ANSWER LEAVE A MESSAGE AND SOMEONE WILL RETURN YOUR CALL [phone]

## 2024-06-12 ENCOUNTER — APPOINTMENT (OUTPATIENT)
Dept: CARDIOLOGY | Facility: HOSPITAL | Age: 69
End: 2024-06-12
Payer: MEDICARE

## 2024-06-12 ENCOUNTER — HOSPITAL ENCOUNTER (INPATIENT)
Facility: HOSPITAL | Age: 69
LOS: 1 days | Discharge: HOME | End: 2024-06-13
Attending: SURGERY | Admitting: INTERNAL MEDICINE
Payer: MEDICARE

## 2024-06-12 ENCOUNTER — APPOINTMENT (OUTPATIENT)
Dept: GENERAL RADIOLOGY | Facility: HOSPITAL | Age: 69
End: 2024-06-12
Payer: MEDICARE

## 2024-06-12 ENCOUNTER — ANESTHESIA (OUTPATIENT)
Dept: PERIOP | Facility: HOSPITAL | Age: 69
End: 2024-06-12
Payer: MEDICARE

## 2024-06-12 ENCOUNTER — ANESTHESIA EVENT (OUTPATIENT)
Dept: PERIOP | Facility: HOSPITAL | Age: 69
End: 2024-06-12
Payer: MEDICARE

## 2024-06-12 DIAGNOSIS — K80.20 CALCULUS OF GALLBLADDER WITHOUT CHOLECYSTITIS WITHOUT OBSTRUCTION: ICD-10-CM

## 2024-06-12 PROBLEM — R00.1 BRADYCARDIA: Status: ACTIVE | Noted: 2024-06-12

## 2024-06-12 LAB
ALBUMIN SERPL-MCNC: 3.6 G/DL (ref 3.5–5.2)
ALBUMIN/GLOB SERPL: 1.6 G/DL
ALP SERPL-CCNC: 34 U/L (ref 39–117)
ALT SERPL W P-5'-P-CCNC: 27 U/L (ref 1–41)
ANION GAP SERPL CALCULATED.3IONS-SCNC: 12.9 MMOL/L (ref 5–15)
ANION GAP SERPL CALCULATED.3IONS-SCNC: 13.7 MMOL/L (ref 5–15)
ANION GAP SERPL CALCULATED.3IONS-SCNC: 14.8 MMOL/L (ref 5–15)
AST SERPL-CCNC: 35 U/L (ref 1–40)
BASOPHILS # BLD AUTO: 0.08 10*3/MM3 (ref 0–0.2)
BASOPHILS NFR BLD AUTO: 0.4 % (ref 0–1.5)
BH CV ECHO MEAS - AO ROOT DIAM: 2.7 CM
BH CV ECHO MEAS - EDV(CUBED): 81.9 ML
BH CV ECHO MEAS - EDV(MOD-SP2): 60.4 ML
BH CV ECHO MEAS - EDV(MOD-SP4): 105 ML
BH CV ECHO MEAS - EF(MOD-BP): 57.7 %
BH CV ECHO MEAS - EF(MOD-SP2): 49.5 %
BH CV ECHO MEAS - EF(MOD-SP4): 65 %
BH CV ECHO MEAS - ESV(CUBED): 13 ML
BH CV ECHO MEAS - ESV(MOD-SP2): 30.5 ML
BH CV ECHO MEAS - ESV(MOD-SP4): 36.8 ML
BH CV ECHO MEAS - FS: 45.9 %
BH CV ECHO MEAS - IVS/LVPW: 1.06 CM
BH CV ECHO MEAS - IVSD: 1.1 CM
BH CV ECHO MEAS - LA DIMENSION: 4.4 CM
BH CV ECHO MEAS - LAT PEAK E' VEL: 8.3 CM/SEC
BH CV ECHO MEAS - LV MASS(C)D: 158.9 GRAMS
BH CV ECHO MEAS - LVIDD: 4.3 CM
BH CV ECHO MEAS - LVIDS: 2.35 CM
BH CV ECHO MEAS - LVPWD: 1.04 CM
BH CV ECHO MEAS - MED PEAK E' VEL: 7.3 CM/SEC
BH CV ECHO MEAS - MV A MAX VEL: 77.1 CM/SEC
BH CV ECHO MEAS - MV DEC SLOPE: 434.6 CM/SEC2
BH CV ECHO MEAS - MV DEC TIME: 0.26 SEC
BH CV ECHO MEAS - MV E MAX VEL: 113 CM/SEC
BH CV ECHO MEAS - MV E/A: 1.47
BH CV ECHO MEAS - RVDD: 2.9 CM
BH CV ECHO MEAS - SV(MOD-SP2): 29.9 ML
BH CV ECHO MEAS - SV(MOD-SP4): 68.2 ML
BH CV ECHO MEASUREMENTS AVERAGE E/E' RATIO: 14.49
BILIRUB SERPL-MCNC: 0.4 MG/DL (ref 0–1.2)
BUN SERPL-MCNC: 21 MG/DL (ref 8–23)
BUN SERPL-MCNC: 21 MG/DL (ref 8–23)
BUN SERPL-MCNC: 23 MG/DL (ref 8–23)
BUN/CREAT SERPL: 16.7 (ref 7–25)
BUN/CREAT SERPL: 17 (ref 7–25)
BUN/CREAT SERPL: 18.3 (ref 7–25)
CALCIUM SPEC-SCNC: 8.7 MG/DL (ref 8.6–10.5)
CALCIUM SPEC-SCNC: 8.8 MG/DL (ref 8.6–10.5)
CALCIUM SPEC-SCNC: 9.5 MG/DL (ref 8.6–10.5)
CHLORIDE SERPL-SCNC: 101 MMOL/L (ref 98–107)
CHLORIDE SERPL-SCNC: 98 MMOL/L (ref 98–107)
CHLORIDE SERPL-SCNC: 99 MMOL/L (ref 98–107)
CO2 SERPL-SCNC: 16.2 MMOL/L (ref 22–29)
CO2 SERPL-SCNC: 17.3 MMOL/L (ref 22–29)
CO2 SERPL-SCNC: 22.1 MMOL/L (ref 22–29)
CREAT SERPL-MCNC: 1.15 MG/DL (ref 0.76–1.27)
CREAT SERPL-MCNC: 1.26 MG/DL (ref 0.76–1.27)
CREAT SERPL-MCNC: 1.35 MG/DL (ref 0.76–1.27)
DEPRECATED RDW RBC AUTO: 43.5 FL (ref 37–54)
EGFRCR SERPLBLD CKD-EPI 2021: 56.8 ML/MIN/1.73
EGFRCR SERPLBLD CKD-EPI 2021: 61.7 ML/MIN/1.73
EGFRCR SERPLBLD CKD-EPI 2021: 68.9 ML/MIN/1.73
EOSINOPHIL # BLD AUTO: 0.3 10*3/MM3 (ref 0–0.4)
EOSINOPHIL NFR BLD AUTO: 1.3 % (ref 0.3–6.2)
ERYTHROCYTE [DISTWIDTH] IN BLOOD BY AUTOMATED COUNT: 12.4 % (ref 12.3–15.4)
GLOBULIN UR ELPH-MCNC: 2.2 GM/DL
GLUCOSE SERPL-MCNC: 117 MG/DL (ref 65–99)
GLUCOSE SERPL-MCNC: 157 MG/DL (ref 65–99)
GLUCOSE SERPL-MCNC: 170 MG/DL (ref 65–99)
HCT VFR BLD AUTO: 39 % (ref 37.5–51)
HGB BLD-MCNC: 13.4 G/DL (ref 13–17.7)
IMM GRANULOCYTES # BLD AUTO: 0.2 10*3/MM3 (ref 0–0.05)
IMM GRANULOCYTES NFR BLD AUTO: 0.9 % (ref 0–0.5)
IVRT: 74 MS
LEFT ATRIUM VOLUME INDEX: 31.1 ML/M2
LYMPHOCYTES # BLD AUTO: 2.05 10*3/MM3 (ref 0.7–3.1)
LYMPHOCYTES NFR BLD AUTO: 9.1 % (ref 19.6–45.3)
MAGNESIUM SERPL-MCNC: 1.5 MG/DL (ref 1.6–2.4)
MCH RBC QN AUTO: 33 PG (ref 26.6–33)
MCHC RBC AUTO-ENTMCNC: 34.4 G/DL (ref 31.5–35.7)
MCV RBC AUTO: 96.1 FL (ref 79–97)
MONOCYTES # BLD AUTO: 1.06 10*3/MM3 (ref 0.1–0.9)
MONOCYTES NFR BLD AUTO: 4.7 % (ref 5–12)
NEUTROPHILS NFR BLD AUTO: 18.77 10*3/MM3 (ref 1.7–7)
NEUTROPHILS NFR BLD AUTO: 83.6 % (ref 42.7–76)
NRBC BLD AUTO-RTO: 0 /100 WBC (ref 0–0.2)
PLATELET # BLD AUTO: 173 10*3/MM3 (ref 140–450)
PMV BLD AUTO: 10.5 FL (ref 6–12)
POTASSIUM SERPL-SCNC: 4 MMOL/L (ref 3.5–5.2)
POTASSIUM SERPL-SCNC: 4.2 MMOL/L (ref 3.5–5.2)
POTASSIUM SERPL-SCNC: 4.6 MMOL/L (ref 3.5–5.2)
PROT SERPL-MCNC: 5.8 G/DL (ref 6–8.5)
QT INTERVAL: 452 MS
QT INTERVAL: 454 MS
QTC INTERVAL: 428 MS
QTC INTERVAL: 452 MS
RBC # BLD AUTO: 4.06 10*6/MM3 (ref 4.14–5.8)
SODIUM SERPL-SCNC: 129 MMOL/L (ref 136–145)
SODIUM SERPL-SCNC: 132 MMOL/L (ref 136–145)
SODIUM SERPL-SCNC: 134 MMOL/L (ref 136–145)
WBC NRBC COR # BLD AUTO: 22.46 10*3/MM3 (ref 3.4–10.8)

## 2024-06-12 PROCEDURE — 25010000002 MAGNESIUM SULFATE 2 GM/50ML SOLUTION: Performed by: INTERNAL MEDICINE

## 2024-06-12 PROCEDURE — 25810000003 SODIUM CHLORIDE 0.9 % SOLUTION: Performed by: INTERNAL MEDICINE

## 2024-06-12 PROCEDURE — 71045 X-RAY EXAM CHEST 1 VIEW: CPT

## 2024-06-12 PROCEDURE — 99223 1ST HOSP IP/OBS HIGH 75: CPT | Performed by: INTERNAL MEDICINE

## 2024-06-12 PROCEDURE — 80053 COMPREHEN METABOLIC PANEL: CPT | Performed by: ANESTHESIOLOGY

## 2024-06-12 PROCEDURE — 99291 CRITICAL CARE FIRST HOUR: CPT | Performed by: INTERNAL MEDICINE

## 2024-06-12 PROCEDURE — 25010000002 INDOCYANINE GREEN 25 MG RECONSTITUTED SOLUTION: Performed by: SURGERY

## 2024-06-12 PROCEDURE — 25010000002 GLYCOPYRROLATE 0.2 MG/ML SOLUTION: Performed by: NURSE ANESTHETIST, CERTIFIED REGISTERED

## 2024-06-12 PROCEDURE — 85025 COMPLETE CBC W/AUTO DIFF WBC: CPT | Performed by: ANESTHESIOLOGY

## 2024-06-12 PROCEDURE — 25010000002 DEXAMETHASONE PER 1 MG: Performed by: NURSE ANESTHETIST, CERTIFIED REGISTERED

## 2024-06-12 PROCEDURE — 25010000002 BUPIVACAINE (PF) 0.25 % SOLUTION: Performed by: SURGERY

## 2024-06-12 PROCEDURE — 25010000002 MIDAZOLAM PER 1MG: Performed by: ANESTHESIOLOGY

## 2024-06-12 PROCEDURE — 25010000002 DOPAMINE PER 40 MG: Performed by: ANESTHESIOLOGY

## 2024-06-12 PROCEDURE — 25010000002 ONDANSETRON PER 1 MG: Performed by: NURSE ANESTHETIST, CERTIFIED REGISTERED

## 2024-06-12 PROCEDURE — 93306 TTE W/DOPPLER COMPLETE: CPT

## 2024-06-12 PROCEDURE — 93005 ELECTROCARDIOGRAM TRACING: CPT | Performed by: SURGERY

## 2024-06-12 PROCEDURE — 25010000002 SUGAMMADEX 200 MG/2ML SOLUTION: Performed by: NURSE ANESTHETIST, CERTIFIED REGISTERED

## 2024-06-12 PROCEDURE — 25810000003 LACTATED RINGERS PER 1000 ML: Performed by: SURGERY

## 2024-06-12 PROCEDURE — 93306 TTE W/DOPPLER COMPLETE: CPT | Performed by: SPECIALIST

## 2024-06-12 PROCEDURE — 25010000002 FENTANYL CITRATE (PF) 50 MCG/ML SOLUTION: Performed by: NURSE ANESTHETIST, CERTIFIED REGISTERED

## 2024-06-12 PROCEDURE — 0FT44ZZ RESECTION OF GALLBLADDER, PERCUTANEOUS ENDOSCOPIC APPROACH: ICD-10-PCS | Performed by: SURGERY

## 2024-06-12 PROCEDURE — S2900 ROBOTIC SURGICAL SYSTEM: HCPCS | Performed by: SURGERY

## 2024-06-12 PROCEDURE — 8E0W4CZ ROBOTIC ASSISTED PROCEDURE OF TRUNK REGION, PERCUTANEOUS ENDOSCOPIC APPROACH: ICD-10-PCS | Performed by: SURGERY

## 2024-06-12 PROCEDURE — 25810000003 LACTATED RINGERS PER 1000 ML: Performed by: ANESTHESIOLOGY

## 2024-06-12 PROCEDURE — 88304 TISSUE EXAM BY PATHOLOGIST: CPT | Performed by: SURGERY

## 2024-06-12 PROCEDURE — 25010000002 PROPOFOL 200 MG/20ML EMULSION: Performed by: NURSE ANESTHETIST, CERTIFIED REGISTERED

## 2024-06-12 PROCEDURE — 99221 1ST HOSP IP/OBS SF/LOW 40: CPT | Performed by: SPECIALIST

## 2024-06-12 PROCEDURE — 47563 LAPARO CHOLECYSTECTOMY/GRAPH: CPT | Performed by: SURGERY

## 2024-06-12 PROCEDURE — 93005 ELECTROCARDIOGRAM TRACING: CPT | Performed by: ANESTHESIOLOGY

## 2024-06-12 PROCEDURE — 83735 ASSAY OF MAGNESIUM: CPT | Performed by: ANESTHESIOLOGY

## 2024-06-12 PROCEDURE — 25010000002 ATROPINE SULFATE 0.4 MG/ML SOLUTION: Performed by: NURSE ANESTHETIST, CERTIFIED REGISTERED

## 2024-06-12 PROCEDURE — 93010 ELECTROCARDIOGRAM REPORT: CPT | Performed by: SPECIALIST

## 2024-06-12 DEVICE — MEDIUM-LARGE LIGATION CLIPS 6 CLIPS/CART
Type: IMPLANTABLE DEVICE | Site: ABDOMEN | Status: FUNCTIONAL
Brand: VAS-Q-CLIP

## 2024-06-12 RX ORDER — MIDAZOLAM HYDROCHLORIDE 2 MG/2ML
2 INJECTION, SOLUTION INTRAMUSCULAR; INTRAVENOUS ONCE
Status: COMPLETED | OUTPATIENT
Start: 2024-06-12 | End: 2024-06-12

## 2024-06-12 RX ORDER — SODIUM CHLORIDE 9 MG/ML
100 INJECTION, SOLUTION INTRAVENOUS CONTINUOUS
Status: DISCONTINUED | OUTPATIENT
Start: 2024-06-12 | End: 2024-06-13

## 2024-06-12 RX ORDER — EPHEDRINE SULFATE 50 MG/ML
INJECTION, SOLUTION INTRAVENOUS AS NEEDED
Status: DISCONTINUED | OUTPATIENT
Start: 2024-06-12 | End: 2024-06-12 | Stop reason: SURG

## 2024-06-12 RX ORDER — SODIUM CHLORIDE, SODIUM LACTATE, POTASSIUM CHLORIDE, CALCIUM CHLORIDE 600; 310; 30; 20 MG/100ML; MG/100ML; MG/100ML; MG/100ML
9 INJECTION, SOLUTION INTRAVENOUS CONTINUOUS PRN
Status: DISCONTINUED | OUTPATIENT
Start: 2024-06-12 | End: 2024-06-12 | Stop reason: HOSPADM

## 2024-06-12 RX ORDER — MEPERIDINE HYDROCHLORIDE 25 MG/ML
12.5 INJECTION INTRAMUSCULAR; INTRAVENOUS; SUBCUTANEOUS
Status: DISCONTINUED | OUTPATIENT
Start: 2024-06-12 | End: 2024-06-12 | Stop reason: HOSPADM

## 2024-06-12 RX ORDER — SODIUM CHLORIDE 9 MG/ML
40 INJECTION, SOLUTION INTRAVENOUS AS NEEDED
Status: DISCONTINUED | OUTPATIENT
Start: 2024-06-12 | End: 2024-06-12 | Stop reason: HOSPADM

## 2024-06-12 RX ORDER — ONDANSETRON 4 MG/1
4 TABLET, ORALLY DISINTEGRATING ORAL EVERY 6 HOURS PRN
Status: DISCONTINUED | OUTPATIENT
Start: 2024-06-12 | End: 2024-06-13 | Stop reason: HOSPADM

## 2024-06-12 RX ORDER — LIDOCAINE HYDROCHLORIDE 20 MG/ML
INJECTION, SOLUTION EPIDURAL; INFILTRATION; INTRACAUDAL; PERINEURAL AS NEEDED
Status: DISCONTINUED | OUTPATIENT
Start: 2024-06-12 | End: 2024-06-12 | Stop reason: SURG

## 2024-06-12 RX ORDER — ACETAMINOPHEN 160 MG/5ML
650 SOLUTION ORAL EVERY 4 HOURS PRN
Status: DISCONTINUED | OUTPATIENT
Start: 2024-06-12 | End: 2024-06-13 | Stop reason: HOSPADM

## 2024-06-12 RX ORDER — BISACODYL 10 MG
10 SUPPOSITORY, RECTAL RECTAL DAILY PRN
Status: DISCONTINUED | OUTPATIENT
Start: 2024-06-12 | End: 2024-06-13 | Stop reason: HOSPADM

## 2024-06-12 RX ORDER — HYDROCODONE BITARTRATE AND ACETAMINOPHEN 5; 325 MG/1; MG/1
1 TABLET ORAL ONCE AS NEEDED
Status: DISCONTINUED | OUTPATIENT
Start: 2024-06-12 | End: 2024-06-12 | Stop reason: HOSPADM

## 2024-06-12 RX ORDER — MAGNESIUM SULFATE HEPTAHYDRATE 40 MG/ML
2 INJECTION, SOLUTION INTRAVENOUS ONCE
Status: COMPLETED | OUTPATIENT
Start: 2024-06-12 | End: 2024-06-12

## 2024-06-12 RX ORDER — ONDANSETRON 4 MG/1
4 TABLET, ORALLY DISINTEGRATING ORAL ONCE AS NEEDED
Status: DISCONTINUED | OUTPATIENT
Start: 2024-06-12 | End: 2024-06-12

## 2024-06-12 RX ORDER — SODIUM CHLORIDE 9 MG/ML
40 INJECTION, SOLUTION INTRAVENOUS AS NEEDED
Status: DISCONTINUED | OUTPATIENT
Start: 2024-06-12 | End: 2024-06-13 | Stop reason: HOSPADM

## 2024-06-12 RX ORDER — ACETAMINOPHEN 500 MG
1000 TABLET ORAL ONCE
Status: COMPLETED | OUTPATIENT
Start: 2024-06-12 | End: 2024-06-12

## 2024-06-12 RX ORDER — ALLOPURINOL 300 MG/1
300 TABLET ORAL NIGHTLY
Status: DISCONTINUED | OUTPATIENT
Start: 2024-06-12 | End: 2024-06-13 | Stop reason: HOSPADM

## 2024-06-12 RX ORDER — ROCURONIUM BROMIDE 10 MG/ML
INJECTION, SOLUTION INTRAVENOUS AS NEEDED
Status: DISCONTINUED | OUTPATIENT
Start: 2024-06-12 | End: 2024-06-12 | Stop reason: SURG

## 2024-06-12 RX ORDER — IBUPROFEN 600 MG/1
600 TABLET ORAL EVERY 6 HOURS PRN
Status: DISCONTINUED | OUTPATIENT
Start: 2024-06-12 | End: 2024-06-12 | Stop reason: HOSPADM

## 2024-06-12 RX ORDER — PROMETHAZINE HYDROCHLORIDE 25 MG/1
25 SUPPOSITORY RECTAL ONCE AS NEEDED
Status: DISCONTINUED | OUTPATIENT
Start: 2024-06-12 | End: 2024-06-12 | Stop reason: HOSPADM

## 2024-06-12 RX ORDER — SODIUM CHLORIDE, SODIUM LACTATE, POTASSIUM CHLORIDE, CALCIUM CHLORIDE 600; 310; 30; 20 MG/100ML; MG/100ML; MG/100ML; MG/100ML
70 INJECTION, SOLUTION INTRAVENOUS CONTINUOUS
Status: DISCONTINUED | OUTPATIENT
Start: 2024-06-12 | End: 2024-06-12

## 2024-06-12 RX ORDER — ACETAMINOPHEN 650 MG/1
650 SUPPOSITORY RECTAL EVERY 4 HOURS PRN
Status: DISCONTINUED | OUTPATIENT
Start: 2024-06-12 | End: 2024-06-13 | Stop reason: HOSPADM

## 2024-06-12 RX ORDER — LEVOTHYROXINE SODIUM 137 UG/1
137 TABLET ORAL
Status: DISCONTINUED | OUTPATIENT
Start: 2024-06-13 | End: 2024-06-13 | Stop reason: HOSPADM

## 2024-06-12 RX ORDER — ONDANSETRON 2 MG/ML
4 INJECTION INTRAMUSCULAR; INTRAVENOUS ONCE AS NEEDED
Status: DISCONTINUED | OUTPATIENT
Start: 2024-06-12 | End: 2024-06-12

## 2024-06-12 RX ORDER — OXYCODONE HYDROCHLORIDE 5 MG/1
5 TABLET ORAL
Status: DISCONTINUED | OUTPATIENT
Start: 2024-06-12 | End: 2024-06-12 | Stop reason: HOSPADM

## 2024-06-12 RX ORDER — ONDANSETRON 2 MG/ML
4 INJECTION INTRAMUSCULAR; INTRAVENOUS EVERY 6 HOURS PRN
Status: DISCONTINUED | OUTPATIENT
Start: 2024-06-12 | End: 2024-06-13 | Stop reason: HOSPADM

## 2024-06-12 RX ORDER — PROMETHAZINE HYDROCHLORIDE 12.5 MG/1
25 TABLET ORAL ONCE AS NEEDED
Status: DISCONTINUED | OUTPATIENT
Start: 2024-06-12 | End: 2024-06-12 | Stop reason: HOSPADM

## 2024-06-12 RX ORDER — SODIUM CHLORIDE 0.9 % (FLUSH) 0.9 %
10 SYRINGE (ML) INJECTION AS NEEDED
Status: DISCONTINUED | OUTPATIENT
Start: 2024-06-12 | End: 2024-06-13 | Stop reason: HOSPADM

## 2024-06-12 RX ORDER — ATROPINE SULFATE 0.4 MG/ML
INJECTION, SOLUTION INTRAVENOUS AS NEEDED
Status: DISCONTINUED | OUTPATIENT
Start: 2024-06-12 | End: 2024-06-12 | Stop reason: SURG

## 2024-06-12 RX ORDER — SODIUM CHLORIDE 0.9 % (FLUSH) 0.9 %
10 SYRINGE (ML) INJECTION EVERY 12 HOURS SCHEDULED
Status: DISCONTINUED | OUTPATIENT
Start: 2024-06-12 | End: 2024-06-12 | Stop reason: HOSPADM

## 2024-06-12 RX ORDER — PANTOPRAZOLE SODIUM 40 MG/1
40 TABLET, DELAYED RELEASE ORAL EVERY MORNING
Status: DISCONTINUED | OUTPATIENT
Start: 2024-06-12 | End: 2024-06-13 | Stop reason: HOSPADM

## 2024-06-12 RX ORDER — FENTANYL CITRATE 50 UG/ML
INJECTION, SOLUTION INTRAMUSCULAR; INTRAVENOUS AS NEEDED
Status: DISCONTINUED | OUTPATIENT
Start: 2024-06-12 | End: 2024-06-12 | Stop reason: SURG

## 2024-06-12 RX ORDER — ONDANSETRON 2 MG/ML
4 INJECTION INTRAMUSCULAR; INTRAVENOUS ONCE AS NEEDED
Status: DISCONTINUED | OUTPATIENT
Start: 2024-06-12 | End: 2024-06-12 | Stop reason: HOSPADM

## 2024-06-12 RX ORDER — HYDROCODONE BITARTRATE AND ACETAMINOPHEN 5; 325 MG/1; MG/1
1 TABLET ORAL EVERY 6 HOURS PRN
Qty: 10 TABLET | Refills: 0 | Status: SHIPPED | OUTPATIENT
Start: 2024-06-12

## 2024-06-12 RX ORDER — SODIUM CHLORIDE 0.9 % (FLUSH) 0.9 %
10 SYRINGE (ML) INJECTION EVERY 12 HOURS SCHEDULED
Status: DISCONTINUED | OUTPATIENT
Start: 2024-06-12 | End: 2024-06-13 | Stop reason: HOSPADM

## 2024-06-12 RX ORDER — INDOCYANINE GREEN AND WATER 25 MG
1.25 KIT INJECTION ONCE
Status: COMPLETED | OUTPATIENT
Start: 2024-06-12 | End: 2024-06-12

## 2024-06-12 RX ORDER — DEXAMETHASONE SODIUM PHOSPHATE 4 MG/ML
INJECTION, SOLUTION INTRA-ARTICULAR; INTRALESIONAL; INTRAMUSCULAR; INTRAVENOUS; SOFT TISSUE AS NEEDED
Status: DISCONTINUED | OUTPATIENT
Start: 2024-06-12 | End: 2024-06-12 | Stop reason: SURG

## 2024-06-12 RX ORDER — BUPIVACAINE HYDROCHLORIDE 2.5 MG/ML
INJECTION, SOLUTION EPIDURAL; INFILTRATION; INTRACAUDAL AS NEEDED
Status: DISCONTINUED | OUTPATIENT
Start: 2024-06-12 | End: 2024-06-12 | Stop reason: HOSPADM

## 2024-06-12 RX ORDER — GLYCOPYRROLATE 0.2 MG/ML
INJECTION INTRAMUSCULAR; INTRAVENOUS AS NEEDED
Status: DISCONTINUED | OUTPATIENT
Start: 2024-06-12 | End: 2024-06-12 | Stop reason: SURG

## 2024-06-12 RX ORDER — ONDANSETRON 2 MG/ML
4 INJECTION INTRAMUSCULAR; INTRAVENOUS EVERY 6 HOURS PRN
Status: DISCONTINUED | OUTPATIENT
Start: 2024-06-12 | End: 2024-06-12

## 2024-06-12 RX ORDER — AMOXICILLIN 250 MG
2 CAPSULE ORAL 2 TIMES DAILY PRN
Status: DISCONTINUED | OUTPATIENT
Start: 2024-06-12 | End: 2024-06-13 | Stop reason: HOSPADM

## 2024-06-12 RX ORDER — ACETAMINOPHEN 325 MG/1
650 TABLET ORAL EVERY 4 HOURS PRN
Status: DISCONTINUED | OUTPATIENT
Start: 2024-06-12 | End: 2024-06-13 | Stop reason: HOSPADM

## 2024-06-12 RX ORDER — DOPAMINE HYDROCHLORIDE 160 MG/100ML
2-20 INJECTION, SOLUTION INTRAVENOUS
Status: DISCONTINUED | OUTPATIENT
Start: 2024-06-12 | End: 2024-06-13

## 2024-06-12 RX ORDER — BISACODYL 5 MG/1
5 TABLET, DELAYED RELEASE ORAL DAILY PRN
Status: DISCONTINUED | OUTPATIENT
Start: 2024-06-12 | End: 2024-06-13 | Stop reason: HOSPADM

## 2024-06-12 RX ORDER — NITROGLYCERIN 0.4 MG/1
0.4 TABLET SUBLINGUAL
Status: DISCONTINUED | OUTPATIENT
Start: 2024-06-12 | End: 2024-06-13 | Stop reason: HOSPADM

## 2024-06-12 RX ORDER — ONDANSETRON 2 MG/ML
INJECTION INTRAMUSCULAR; INTRAVENOUS AS NEEDED
Status: DISCONTINUED | OUTPATIENT
Start: 2024-06-12 | End: 2024-06-12 | Stop reason: SURG

## 2024-06-12 RX ORDER — POLYETHYLENE GLYCOL 3350 17 G/17G
17 POWDER, FOR SOLUTION ORAL DAILY PRN
Status: DISCONTINUED | OUTPATIENT
Start: 2024-06-12 | End: 2024-06-13 | Stop reason: HOSPADM

## 2024-06-12 RX ORDER — SODIUM CHLORIDE 0.9 % (FLUSH) 0.9 %
10 SYRINGE (ML) INJECTION AS NEEDED
Status: DISCONTINUED | OUTPATIENT
Start: 2024-06-12 | End: 2024-06-12 | Stop reason: HOSPADM

## 2024-06-12 RX ORDER — PROPOFOL 10 MG/ML
INJECTION, EMULSION INTRAVENOUS AS NEEDED
Status: DISCONTINUED | OUTPATIENT
Start: 2024-06-12 | End: 2024-06-12 | Stop reason: SURG

## 2024-06-12 RX ADMIN — MAGNESIUM SULFATE HEPTAHYDRATE 2 G: 40 INJECTION, SOLUTION INTRAVENOUS at 12:52

## 2024-06-12 RX ADMIN — GLYCOPYRROLATE 0.2 MG: 0.2 INJECTION INTRAMUSCULAR; INTRAVENOUS at 07:38

## 2024-06-12 RX ADMIN — SUGAMMADEX 200 MG: 100 INJECTION, SOLUTION INTRAVENOUS at 08:21

## 2024-06-12 RX ADMIN — PANTOPRAZOLE SODIUM 40 MG: 40 TABLET, DELAYED RELEASE ORAL at 12:52

## 2024-06-12 RX ADMIN — PROPOFOL 180 MG: 10 INJECTION, EMULSION INTRAVENOUS at 07:24

## 2024-06-12 RX ADMIN — ROCURONIUM BROMIDE 50 MG: 10 INJECTION, SOLUTION INTRAVENOUS at 07:24

## 2024-06-12 RX ADMIN — Medication 10 ML: at 20:24

## 2024-06-12 RX ADMIN — EPHEDRINE SULFATE 10 MG: 50 INJECTION INTRAVENOUS at 07:51

## 2024-06-12 RX ADMIN — SODIUM CHLORIDE, POTASSIUM CHLORIDE, SODIUM LACTATE AND CALCIUM CHLORIDE 9 ML/HR: 600; 310; 30; 20 INJECTION, SOLUTION INTRAVENOUS at 06:58

## 2024-06-12 RX ADMIN — MAGNESIUM SULFATE HEPTAHYDRATE 2 G: 40 INJECTION, SOLUTION INTRAVENOUS at 15:06

## 2024-06-12 RX ADMIN — ATROPINE SULFATE 0.4 MG: 0.4 INJECTION, SOLUTION INTRAVENOUS at 07:52

## 2024-06-12 RX ADMIN — FENTANYL CITRATE 50 MCG: 50 INJECTION, SOLUTION INTRAMUSCULAR; INTRAVENOUS at 08:34

## 2024-06-12 RX ADMIN — SODIUM CHLORIDE, POTASSIUM CHLORIDE, SODIUM LACTATE AND CALCIUM CHLORIDE 70 ML/HR: 600; 310; 30; 20 INJECTION, SOLUTION INTRAVENOUS at 09:19

## 2024-06-12 RX ADMIN — DOPAMINE HYDROCHLORIDE 2.51 MCG/KG/MIN: 160 INJECTION, SOLUTION INTRAVENOUS at 09:22

## 2024-06-12 RX ADMIN — ONDANSETRON 4 MG: 2 INJECTION INTRAMUSCULAR; INTRAVENOUS at 07:29

## 2024-06-12 RX ADMIN — SODIUM CHLORIDE 100 ML/HR: 9 INJECTION, SOLUTION INTRAVENOUS at 19:29

## 2024-06-12 RX ADMIN — EPHEDRINE SULFATE 10 MG: 50 INJECTION INTRAVENOUS at 07:42

## 2024-06-12 RX ADMIN — INDOCYANINE GREEN AND WATER 1.25 MG: KIT at 07:03

## 2024-06-12 RX ADMIN — MIDAZOLAM HYDROCHLORIDE 2 MG: 1 INJECTION, SOLUTION INTRAMUSCULAR; INTRAVENOUS at 07:03

## 2024-06-12 RX ADMIN — ALLOPURINOL 300 MG: 300 TABLET ORAL at 20:23

## 2024-06-12 RX ADMIN — FENTANYL CITRATE 50 MCG: 50 INJECTION, SOLUTION INTRAMUSCULAR; INTRAVENOUS at 07:24

## 2024-06-12 RX ADMIN — DEXAMETHASONE SODIUM PHOSPHATE 4 MG: 4 INJECTION, SOLUTION INTRAMUSCULAR; INTRAVENOUS at 07:29

## 2024-06-12 RX ADMIN — Medication 10 ML: at 12:52

## 2024-06-12 RX ADMIN — ACETAMINOPHEN 1000 MG: 500 TABLET ORAL at 06:58

## 2024-06-12 RX ADMIN — LIDOCAINE HYDROCHLORIDE 100 MG: 20 INJECTION, SOLUTION EPIDURAL; INFILTRATION; INTRACAUDAL; PERINEURAL at 07:24

## 2024-06-12 RX ADMIN — SODIUM CHLORIDE 100 ML/HR: 9 INJECTION, SOLUTION INTRAVENOUS at 10:28

## 2024-06-12 NOTE — OP NOTE
CHOLECYSTECTOMY LAPAROSCOPIC WITH DAVINCI ROBOT  Procedure Report    Patient Name:  Ramesh Garcia  YOB: 1955    Date of Surgery:  6/12/2024     Indications: The patient is a 68-year-old gentleman that presented with symptomatic gallstones.  The decision was made to proceed with a robotic cholecystectomy.    Pre-op Diagnosis: Gallstones    Post-Op Diagnosis: Same    Procedure/CPT® Codes:    Robotic cholecystectomy    Staff:  Surgeon(s):  Maik Purcell MD    Assistant: Kate Pascal CRNFA    Anesthesia: General    Estimated Blood Loss: minimal    Implants:    Implant Name Type Inv. Item Serial No.  Lot No. LRB No. Used Action   CARTRDG CLIP LIGAT VASQCLIP [id number] MD/LG STRL - [id number] Implant CARTRDG CLIP LIGAT VASQCLIP [id number] MD/LG STRL  [employer] [id number] N/A 1 Implanted       Specimen:          Specimens       ID Source Type Tests Collected By Collected At Frozen?    A Gallbladder Tissue TISSUE PATHOLOGY EXAM   Maik Purcell MD 6/12/24 0812     Description: Gallbladder and contents                Findings: Chronic cholecystitis    Complications: None    Description of Procedure: The patient was taken to the operating room and placed on the table in supine position.  After induction of general anesthesia, the abdomen was prepped and draped sterilely.  The abdomen was insufflated with a Veress needle and a 5 mm right flank port was placed in the peritoneal cavity using a Visiport.  Two 8 mm da Julianne robotic trocars and one 12 mm da Julianne robotic trocar were placed into the abdomen under direct vision.  The da Julianne robot was then brought to the table and the robotic arms were docked to the trocars.  The camera and instruments were then placed in the peritoneal cavity.  The gallbladder was identified.  The dome of the gallbladder was grasped and retracted cephalad by the assistant.  Some omental adhesions were taken down from the gallbladder with cautery.   I was then able to grasp the infundibulum and retracted laterally.  The gallbladder cystic duct junction was dissected and a critical view of safety was obtained.  The camera was switched over to firefly mode and we clearly saw the cystic duct coming up at the infundibulum the gallbladder and we identified the common bile duct.  The cystic duct was then clipped twice proximally once distally with Hem-o-cheyenne clips and divided with the cutting current of the cautery.  We then dissected posteriorly and the cystic artery was dissected and clipped twice proximally and divided distally with cautery.  We then dissected the gallbladder free from the gallbladder fossa using cautery.  The detached gallbladder was then placed in an Endopouch bag.  The gallbladder fossa was examined and we had good hemostasis.  The instruments were removed from the abdomen the robotic arms were undocked from the trocars.  After removing the gallbladder from the abdomen the 12 mm port site was closed with a Kevin-Michele closure device and a 0 Mersilene tie.  The abdomen was desufflated and the other ports were removed.  All skin incisions were closed with 4-0 Vicryl subcuticular sutures.  Sterile dressings were applied and he was taken the postanesthesia recovery room in stable condition.    Assistant: Kate Pascal CRNFA  was responsible for performing the following activities: Retraction, Suturing, Placing Dressing, and Held/Positioned Camera and their skilled assistance was necessary for the success of this case.    Maik Purcell MD     Date: 6/12/2024  Time: 08:31 EDT

## 2024-06-12 NOTE — PLAN OF CARE
Goal Outcome Evaluation:  Plan of Care Reviewed With: patient, spouse        Progress: improving  Outcome Evaluation: Admitted to the CCU post lap nissa due to being in a junctional rhythm. Started on dopamine, converted to sinus bradycardia, weaned dopamine off.

## 2024-06-12 NOTE — ANESTHESIA PREPROCEDURE EVALUATION
Anesthesia Evaluation     Patient summary reviewed and Nursing notes reviewed   no history of anesthetic complications:   NPO Solid Status: > 8 hours  NPO Liquid Status: > 2 hours           Airway   Mallampati: II  TM distance: >3 FB  Neck ROM: full  No difficulty expected  Dental      Pulmonary - normal exam    breath sounds clear to auscultation  (+) a smoker Former,sleep apnea  Cardiovascular - normal exam  Exercise tolerance: good (4-7 METS)    Rhythm: regular  Rate: normal    (+) hypertension, hyperlipidemia      Neuro/Psych- negative ROS  GI/Hepatic/Renal/Endo    (+) renal disease (nephrectomy 2006)-, thyroid problem     Musculoskeletal     Abdominal    Substance History      OB/GYN          Other      history of cancer remission    ROS/Med Hx Other: PAT Nursing Notes unavailable.               Anesthesia Plan    ASA 2     general     (Patient understands anesthesia not responsible for dental damage.)  intravenous induction     Anesthetic plan, risks, benefits, and alternatives have been provided, discussed and informed consent has been obtained with: patient.    Plan discussed with CRNA.    CODE STATUS:

## 2024-06-12 NOTE — ANESTHESIA POSTPROCEDURE EVALUATION
Patient: Ramesh Garcia    Procedure Summary       Date: 06/12/24 Room / Location: [facility]  /  [facility]    Anesthesia Start: 0720 Anesthesia Stop: 0834    Procedure: CHOLECYSTECTOMY LAPAROSCOPIC WITH DAVINCI ROBOT (Abdomen) Diagnosis:       Calculus of gallbladder without cholecystitis without obstruction      (Calculus of gallbladder without cholecystitis without obstruction [K80.20])    Surgeons: Maik Purcell MD Provider: Demian Bright MD    Anesthesia Type: general ASA Status: 2            Anesthesia Type: general    Vitals  Vitals Value Taken Time   BP 89/42 06/12/24 0959   Temp 35.8 °C (96.4 °F) 06/12/24 0831   Pulse 39 06/12/24 1001   Resp 15 06/12/24 0952   SpO2 91 % 06/12/24 1001   Vitals shown include unfiled device data.        Post Anesthesia Care and Evaluation    Patient location during evaluation: bedside  Patient participation: complete - patient participated  Level of consciousness: awake  Pain management: adequate    Airway patency: patent  PONV Status: none  Cardiovascular status: bradycardic and hemodynamically unstable  Respiratory status: acceptable  Hydration status: acceptable    Comments: Pt presented for Lap Ursula.  EKG SR 54, prolonged W[place], on coreg.  Sinus Bradycardia during procedure, BP stable.  Extubated at end.  Junctional wanda, then irr irr wanda, possibly slow Afib. MAP 50's  -Atropine 1 mg given, no response  -Epi titrated 1mg, HR 50's, BP improved  -EKG Afib?  -BMP, Mg, CBC and CXR done    BP then MAP low 60's.  Discussed with cardiologist given possible unstable bradycardia.  Will start dopamine, admit  -Discussed with Dr Smallwood.  Will admit to ICU to monitoring.   -Discussed with wife and pt.  Stable to transfer to ICU 94/44, HR 50's

## 2024-06-12 NOTE — CONSULTS
[facility]   Cardiology Consult Note    Patient Name: Ramesh Garcia  : 1955  MRN: [id number]  Primary Care Physician:  Sushant Stack MD  Referring Physician: Maik Purcell MD  Date of admission: 2024    Subjective   Subjective     Reason for Consult/ Chief Complaint: Bradycardia    HPI:  Ramesh Garcia is a 68 y.o. male admitted for cholecystectomy.  Postprocedure is bradycardic.  Asymptomatic.  No syncopal or presyncopal episode.  No dizziness.  No chest pain.  No previous history of coronary disease.  Takes carvedilol at home.    Review of Systems:      Constitutional no fever,  no weight loss   Skin no rash   Otolaryngeal no difficulty swallowing   Cardiovascular See HPI   Pulmonary no cough, no sputum production   Gastrointestinal no constipation, no diarrhea   Genitourinary no dysuria, no hematuria   Hematologic no easy bruisability, no abnormal bleeding   Musculoskeletal no muscle pain   Neurologic no dizziness, no falls     Personal History       Past Medical/Surgical History:   Past Medical History:   Diagnosis Date    Calculus of gallbladder     Cancer of kidney     LEFT/KIDNEY REMOVED    Concussion     GERD (gastroesophageal reflux disease)     History of transfusion     REPORTS THAT WHEN HAD KIDNEY REMOVED USED HIS BLOOD FOR TRANSFUSION POST SURGERY    Hyperlipidemia     Hypertension     Hypothyroidism     Kidney stones     Pain management     [facility]    Sleep apnea     NO CPAP     Past Surgical History:   Procedure Laterality Date    COLONOSCOPY N/A 2022    Procedure: COLONOSCOPY;  Surgeon: Maik Purcell MD;  Location: [facility] ENDOSCOPY;  Service: General;  Laterality: N/A;  DIVERTICULOSIS    CYSTOSCOPY URETEROSCOPY Right 2023    Procedure: cystoscopy with right ureteroscopy with basket stone extraction and right ureteral stent placement.;  Surgeon: Rony Rose MD;  Location: [facility] MAIN OR;  Service: Urology;  Laterality: Right;    ELBOW  PROCEDURE Left     TRAUMA WOUND    KIDNEY STONE SURGERY      NURY LITHOTRIPSY AND ESWL 6 OR MORE    LUMBAR DISC SURGERY      ?LEVEL    NECK SURGERY  10/23/2015    ACDF C5-C6 and C6-C7-Dr. Suggs/FUSION PT HAS FULL EXTENSION OF NECK    NEPHRECTOMY Left 2006 2006 REMOVED D/T KIDNEY CANCER    SHOULDER SURGERY Bilateral     left 2010 TRAUMA REPAIR, right 2012 RCR         Family History: No Family History of CAD.    Social History:  reports that he has quit smoking. He has quit using smokeless tobacco. He reports that he does not drink alcohol and does not use drugs.    Medications:  Medications Prior to Admission   Medication Sig Dispense Refill Last Dose    allopurinol (ZYLOPRIM) 300 MG tablet Take 1 tablet by mouth Every Night. FOR KIDNEY STONES   6/11/2024    carvedilol (COREG) 25 MG tablet Take 1 tablet by mouth 2 (Two) Times a Day With Meals.   6/12/2024 at 0400    dilTIAZem (TIAZAC) 300 MG 24 hr capsule Take 1 capsule by mouth Daily.   6/12/2024 at 0400    fenofibrate 160 MG tablet Take 1 tablet by mouth Every Evening.   6/11/2024    hydroCHLOROthiazide (HYDRODIURIL) 12.5 MG tablet Take 1 tablet by mouth Daily.   6/11/2024    levothyroxine (SYNTHROID, LEVOTHROID) 137 MCG tablet Take 1 tablet by mouth Daily.   6/12/2024 at 0400    pantoprazole (PROTONIX) 40 MG EC tablet Take 1 tablet by mouth Daily.   6/11/2024    aspirin 81 MG tablet Take 1 tablet by mouth Daily. REPORTS TAKES FOR HEART HEALTH. PT REPORTS THAT HE IS STOPPING TAKING ON HIS OWN WITH LAST DOSE BEING 6/9/24   6/10/2024    multivitamin with minerals tablet tablet Take 1 tablet by mouth Daily.  INSTRUCTED PER ANESTHESIA PROTOCOL   6/10/2024    Omega-3 Fatty Acids (fish oil) 1000 MG capsule capsule Take 1 capsule by mouth Daily With Breakfast.  INSTRUCTED PER ANESTHESIA PROTOCOL   6/10/2024     Current medications:  allopurinol, 300 mg, Oral, Nightly  [START ON 6/13/2024] levothyroxine, 137 mcg, Oral, Q AM  magnesium sulfate, 2 g, Intravenous,  Once  pantoprazole, 40 mg, Oral, QAM  sodium chloride, 10 mL, Intravenous, Q12H      Current IV drips:  DOPamine, 2-20 mcg/kg/min, Last Rate: 7.5 mcg/kg/min (06/12/24 1018)  sodium chloride, 100 mL/hr, Last Rate: 100 mL/hr (06/12/24 1028)        Allergies:  No Known Allergies    Objective    Objective     Vitals:   Temp:  [96.4 °F (35.8 °C)-97.9 °F (36.6 °C)] 97.9 °F (36.6 °C)  Heart Rate:  [34-61] 57  Resp:  [14-23] 15  BP: ()/(31-99) 139/52  Flow (L/min):  [4] 4  FiO2 (%):  [0.6 %] 0.6 %      Physical Exam:    Constitutional: Awake, alert, No acute distress   Eyes: PERRLA, sclerae anicteric, no conjunctival injection  HENT: NCAT, mucous membranes moist  Neck: Supple, no thyromegaly, no lymphadenopathy, trachea midline  Respiratory: Clear to auscultation bilaterally, nonlabored respirations   Cardiovascular: RRR, no murmurs, rubs, or gallops, palpable pedal pulses bilaterally  Gastrointestinal: Positive bowel sounds, soft, nontender, nondistended  Musculoskeletal: No bilateral ankle edema, no clubbing or cyanosis to extremities  Psychiatric: Appropriate affect, cooperative  Neurologic: Oriented x 3, strength symmetric in all extremities, Cranial Nerves grossly intact to confrontation, speech clear  Skin: No rashes.    Result Review    Result Review:  I have personally reviewed the results from the time of this admission to 6/12/2024 11:00 EDT and agree with these findings:  [x]  Laboratory  [x]  EKG/Telemetry   [x]  Cardiology/Vascular   []  Pathology  [x]  Old records  [x]  Medications  Basic Metabolic Panel    Sodium Sodium   Date Value Ref Range Status   06/12/2024 129 (L) 136 - 145 mmol/L Final   06/12/2024 134 (L) 136 - 145 mmol/L Final      Potassium Potassium   Date Value Ref Range Status   06/12/2024 4.6 3.5 - 5.2 mmol/L Final     Comment:     Slight hemolysis detected by analyzer. Result may be falsely elevated.   06/12/2024 4.0 3.5 - 5.2 mmol/L Final     Comment:     Slight hemolysis detected by  "analyzer. Result may be falsely elevated.      Chloride Chloride   Date Value Ref Range Status   06/12/2024 98 98 - 107 mmol/L Final   06/12/2024 99 98 - 107 mmol/L Final      Bicarbonate No results found for: \"PLASMABICARB\"   BUN BUN   Date Value Ref Range Status   06/12/2024 21 8 - 23 mg/dL Final   06/12/2024 21 8 - 23 mg/dL Final      Creatinine Creatinine   Date Value Ref Range Status   06/12/2024 1.26 0.76 - 1.27 mg/dL Final   06/12/2024 1.15 0.76 - 1.27 mg/dL Final      Calcium Calcium   Date Value Ref Range Status   06/12/2024 8.7 8.6 - 10.5 mg/dL Final   06/12/2024 9.5 8.6 - 10.5 mg/dL Final      Glucose      No components found for: \"GLUCOSE.*\"        No results found for: \"PROBNP\"       EKG shows no acute changes.  Telemetry reviewed sinus bradycardia with first-degree AV block    Assessment / Plan     Impression:   1.  Asymptomatic sinus/junctional bradycardia.  2.  Essential hypertension  3.  Status postcholecystectomy.    Plan:   1.  Echocardiogram.  2.  Hold Cardizem and carvedilol for now.  3.  Can use amlodipine instead of Cardizem for blood pressure control.    Electronically signed by Aydin Allen MD, 06/12/24, 11:00 AM EDT.   "

## 2024-06-12 NOTE — CONSULTS
[facility]   Consult Note    Patient Name: Ramesh Garcia  : 1955  MRN: [id number]  Primary Care Physician:  Sushant Stack MD  Referring Physician: Maik Purcell MD  Date of admission: 2024    Inpatient Pulmonology Consult  Consult performed by: Herberth Roblero DO  Consult ordered by: Julito Muñoz DO        Subjective   Subjective     Reason for Consult/ Chief Complaint: Reason for consultation critical care management    History of Present Illness  Ramesh Garcia is a 68 y.o. male admitted to the hospital postcholecystectomy  Developed bradycardia after procedure  Heart rate got into the 30s  Patient had a preoperative heart rate in the 50s  As document heart rate back in  in the 50s as well  To note patient did take his diltiazem along with Coreg  Currently on dopamine    Review of Systems   Positive for abdominal pain postcholecystectomy  Personal History     Past Medical History:   Diagnosis Date    Calculus of gallbladder     Cancer of kidney     LEFT/KIDNEY REMOVED    Concussion     GERD (gastroesophageal reflux disease)     History of transfusion     REPORTS THAT WHEN HAD KIDNEY REMOVED USED HIS BLOOD FOR TRANSFUSION POST SURGERY    Hyperlipidemia     Hypertension     Hypothyroidism     Kidney stones     Pain management     Counts include [facility] beds [facility] the [facility]    Sleep apnea     NO CPAP       Past Surgical History:   Procedure Laterality Date    COLONOSCOPY N/A 2022    Procedure: COLONOSCOPY;  Surgeon: Maik Purcell MD;  Location: [facility] ENDOSCOPY;  Service: General;  Laterality: N/A;  DIVERTICULOSIS    CYSTOSCOPY URETEROSCOPY Right 2023    Procedure: cystoscopy with right ureteroscopy with basket stone extraction and right ureteral stent placement.;  Surgeon: Rony Rose MD;  Location: [facility] MAIN OR;  Service: Urology;  Laterality: Right;    ELBOW PROCEDURE Left     TRAUMA WOUND    KIDNEY STONE SURGERY      NURY LITHOTRIPSY AND ESWL 6 OR MORE    LUMBAR DISC  SURGERY      ?LEVEL    NECK SURGERY  10/23/2015    ACDF C5-C6 and C6-C7-Dr. Suggs/FUSION PT HAS FULL EXTENSION OF NECK    NEPHRECTOMY Left 2006 2006 REMOVED D/T KIDNEY CANCER    SHOULDER SURGERY Bilateral     left 2010 TRAUMA REPAIR, right 2012 RCR       Family History: family history includes Breast cancer in his mother, paternal grandmother, and sister; Heart attack in his father; Hypertension in his father and mother. Otherwise pertinent FHx was reviewed and not pertinent to current issue.    Social History:  reports that he has quit smoking. He has quit using smokeless tobacco. He reports that he does not drink alcohol and does not use drugs.    Home Medications:   HYDROcodone-acetaminophen, allopurinol, aspirin, carvedilol, dilTIAZem, fenofibrate, fish oil, hydroCHLOROthiazide, levothyroxine, multivitamin with minerals, and pantoprazole    Allergies:  No Known Allergies    Objective    Objective     Vitals:  Temp:  [96.4 °F (35.8 °C)-97.9 °F (36.6 °C)] 97.7 °F (36.5 °C)  Heart Rate:  [34-61] 58  Resp:  [14-24] 24  BP: ()/(31-99) 126/72  Flow (L/min):  [4] 4  FiO2 (%):  [0.6 %] 0.6 %    Physical Exam  Vital Signs Reviewed  General WDWN, Alert, NAD.    Chest:  good aeration, clear to auscultation bilaterally, tympanic to percussion bilaterally, no work of breathing noted  CV: RRR, no MGR, .  EXT:  no clubbing, no cyanosis, no edema, no joint tenderness  Neuro:  A&Ox3, CN grossly intact, no focal deficits.  Skin: No rashes or lesions noted  Result Review    Result Review:  I have personally reviewed the results from the time of this admission to 6/12/2024 15:02 EDT and agree with these findings:  [x]  Laboratory  [x]  Microbiology  [x]  Radiology  [x]  EKG/Telemetry   []  Cardiology/Vascular   []  Pathology  []  Old records  []  Other:    Most notable findings include:    Assessment & Plan   Assessment / Plan     Brief Patient Summary:  Ramesh Garcia is a 68 y.o. male who critically ill in the  ICU    Active Hospital Problems:  Active Hospital Problems    Diagnosis     **Calculus of gallbladder without cholecystitis without obstruction     Bradycardia    Acute kidney injury  Hyponatremia    Plan:  Suspect this is medication induced  Patient to take diltiazem 300 and Coreg prior to having surgery this morning  We will continue to hold blood pressure medications  Dopamine to maintain heart rate and mean arterial pressure as patient did have some bradycardia and hypotension at the same time  Currently heart rates in the 50s  Cardiology consulted  Obtain echocardiogram  Place magnesium with 4 g of IV mag  Resume Synthroid  Continue dopamine    Total critical care time spent 30 minutes  Electronically signed by Herberth Roblero DO, 06/12/24, 3:02 PM EDT.

## 2024-06-12 NOTE — H&P
[facility] HOSPITALIST   HISTORY AND PHYSICAL    Sushant Stack MD    Subjective       CHIEF COMPLAINT:     Low HR    HISTORY OF PRESENT ILLNESS:    69 yo male PMH HTN who had lap nissa d/t gallstones. During procedure and thereafter, he was noted to have bradycardia and also possible Jxn rhythm on tele. D/w anesthesia, and he was given atropine and epi in PACU, and BP remained soft and HR remained low. He did no have any overt chest pain and had not been having any dysnpea recently, but wife and pt did note his energy had been low recently. Given the lack of improvement with above measures, they d/w cardiology who rec dopamine gtt and admission. Currently pt reports some mild post op pain but denies other issues.       REVIEW OF SYSTEMS:  Please see the above history of present illness for pertinent positives and negatives.     Personal History       Past Medical History:   Diagnosis Date    Calculus of gallbladder     Cancer of kidney     LEFT/KIDNEY REMOVED    Concussion     GERD (gastroesophageal reflux disease)     History of transfusion     REPORTS THAT WHEN HAD KIDNEY REMOVED USED HIS BLOOD FOR TRANSFUSION POST SURGERY    Hyperlipidemia     Hypertension     Hypothyroidism     Kidney stones     Pain management     [facility]    Sleep apnea     NO CPAP     Past Surgical History:   Procedure Laterality Date    COLONOSCOPY N/A 11/14/2022    Procedure: COLONOSCOPY;  Surgeon: Maik Purcell MD;  Location: [facility] ENDOSCOPY;  Service: General;  Laterality: N/A;  DIVERTICULOSIS    CYSTOSCOPY URETEROSCOPY Right 01/19/2023    Procedure: cystoscopy with right ureteroscopy with basket stone extraction and right ureteral stent placement.;  Surgeon: Rony Rose MD;  Location: [facility] MAIN OR;  Service: Urology;  Laterality: Right;    ELBOW PROCEDURE Left     TRAUMA WOUND    KIDNEY STONE SURGERY      NURY LITHOTRIPSY AND ESWL 6 OR MORE    LUMBAR DISC SURGERY      ?LEVEL    NECK SURGERY   10/23/2015    ACDF C5-C6 and C6-C7-Dr. Suggs/FUSION PT HAS FULL EXTENSION OF NECK    NEPHRECTOMY Left 2006 2006 REMOVED D/T KIDNEY CANCER    SHOULDER SURGERY Bilateral     left 2010 TRAUMA REPAIR, right 2012 RCR     Family History   Problem Relation Age of Onset    Hypertension Mother     Breast cancer Mother     Hypertension Father     Heart attack Father     Breast cancer Sister     Breast cancer Paternal Grandmother     Malig Hyperthermia Neg Hx      Social History     Tobacco Use    Smoking status: Former    Smokeless tobacco: Former    Tobacco comments:     Quit- over 30 years ago   Vaping Use    Vaping status: Never Used   Substance Use Topics    Alcohol use: Never    Drug use: Never     Medications Prior to Admission   Medication Sig Dispense Refill Last Dose    allopurinol (ZYLOPRIM) 300 MG tablet Take 1 tablet by mouth Every Night. FOR KIDNEY STONES   6/11/2024    carvedilol (COREG) 25 MG tablet Take 1 tablet by mouth 2 (Two) Times a Day With Meals.   6/12/2024 at 0400    dilTIAZem (TIAZAC) 300 MG 24 hr capsule Take 1 capsule by mouth Daily.   6/12/2024 at 0400    fenofibrate 160 MG tablet Take 1 tablet by mouth Every Evening.   6/11/2024    hydroCHLOROthiazide (HYDRODIURIL) 12.5 MG tablet Take 1 tablet by mouth Daily.   6/11/2024    levothyroxine (SYNTHROID, LEVOTHROID) 137 MCG tablet Take 1 tablet by mouth Daily.   6/12/2024 at 0400    pantoprazole (PROTONIX) 40 MG EC tablet Take 1 tablet by mouth Daily.   6/11/2024    aspirin 81 MG tablet Take 1 tablet by mouth Daily. REPORTS TAKES FOR HEART HEALTH. PT REPORTS THAT HE IS STOPPING TAKING ON HIS OWN WITH LAST DOSE BEING 6/9/24   6/10/2024    multivitamin with minerals tablet tablet Take 1 tablet by mouth Daily.  INSTRUCTED PER ANESTHESIA PROTOCOL   6/10/2024    Omega-3 Fatty Acids (fish oil) 1000 MG capsule capsule Take 1 capsule by mouth Daily With Breakfast.  INSTRUCTED PER ANESTHESIA PROTOCOL   6/10/2024     Allergies:  Patient has no known  "allergies.    Immunization History   Administered Date(s) Administered    COVID-19 (MODERNA) 1st,2nd,3rd Dose Monovalent 03/24/2021, 04/21/2021    COVID-19 (MODERNA) Monovalent Original Booster 12/13/2021       Objective       Objective     Vital Signs  Temp:  [96.4 °F (35.8 °C)-97.8 °F (36.6 °C)] 96.4 °F (35.8 °C)  Heart Rate:  [37-61] 45  Resp:  [14-23] 15  BP: ()/(31-99) 100/41  FiO2 (%):  [0.6 %] 0.6 %    Flowsheet Rows      Flowsheet Row First Filed Value   Admission Height 180.3 cm (70.98\") Documented at 06/10/2024 0929   Admission Weight 99.7 kg (219 lb 12.8 oz) Documented at 06/10/2024 0929             Physical Exam:  Physical Exam  Vitals reviewed.   HENT:      Head: Normocephalic.   Cardiovascular:      Rate and Rhythm: Bradycardia present.   Pulmonary:      Effort: No respiratory distress.   Abdominal:      General: There is no distension.      Palpations: Abdomen is soft.   Musculoskeletal:      Right lower leg: No edema.      Left lower leg: No edema.   Skin:     General: Skin is warm.   Neurological:      Mental Status: He is alert and oriented to person, place, and time.           Results Review:   I have reviewed these results below from the time of this admission to 6/12/2024 10:12 EDT :  [x]  Laboratory  []  Microbiology  [x]  Radiology  [x]  EKG/Telemetry   []  Cardiology/Vascular   []  Pathology  []  Old records  []  Other:  Most notable findings include: EKG reviewed after atropine HR 60 but rhythm somewhat irregular. CXR personally reivewed with atelectasis        Assessment / Plan     Assessment & Plan     Active Hospital Problems:  Active Hospital Problems    Diagnosis     **Calculus of gallbladder without cholecystitis without obstruction     Bradycardia      Plan:     Bradycardia  -unsure exact etiology   -EKG reviewed  -holding coreg, cardizem  -continue dopamine gtt  -continue tele  -will consult cardiology, anesthesia had previous d/w them    HTN  -BP low  -holding " antihypertensives  -follow with routine VS    Hypomagnesesmia, Hyponatremia  -will replace mag  -continue IV NS   -follow up na in the am    S/p Lap nissa  -will have surgery follow for post-op needs     Hypothyroidism  -chronic continue home synthroid    MYRANDA  -per hx but reports does not use CPAP    High risk     DVT ppx-SCDs        I discussed the patient's findings and my recommendations with patient, wife and nursing staff.     Electronically signed by Julito Muñoz DO, 06/12/24, 10:12 EDT.        Note disclaimer: At [facility], we believe that sharing information builds trust and better relationships. You are receiving this note because you recently visited [facility]. It is possible you will see health information before a provider has talked with you about it. This kind of information can be easy to misunderstand. To help you fully understand what it means for your health, we urge you to discuss this note with your provider.

## 2024-06-13 ENCOUNTER — READMISSION MANAGEMENT (OUTPATIENT)
Dept: CALL CENTER | Facility: HOSPITAL | Age: 69
End: 2024-06-13
Payer: MEDICARE

## 2024-06-13 VITALS
OXYGEN SATURATION: 93 % | HEART RATE: 66 BPM | HEIGHT: 71 IN | TEMPERATURE: 97.9 F | SYSTOLIC BLOOD PRESSURE: 153 MMHG | DIASTOLIC BLOOD PRESSURE: 67 MMHG | WEIGHT: 215.61 LBS | BODY MASS INDEX: 30.19 KG/M2 | RESPIRATION RATE: 15 BRPM

## 2024-06-13 PROBLEM — R00.1 BRADYCARDIA: Status: RESOLVED | Noted: 2024-06-12 | Resolved: 2024-06-13

## 2024-06-13 LAB
ANION GAP SERPL CALCULATED.3IONS-SCNC: 12.5 MMOL/L (ref 5–15)
BUN SERPL-MCNC: 16 MG/DL (ref 8–23)
BUN/CREAT SERPL: 17 (ref 7–25)
CALCIUM SPEC-SCNC: 8.3 MG/DL (ref 8.6–10.5)
CHLORIDE SERPL-SCNC: 104 MMOL/L (ref 98–107)
CO2 SERPL-SCNC: 19.5 MMOL/L (ref 22–29)
CREAT SERPL-MCNC: 0.94 MG/DL (ref 0.76–1.27)
DEPRECATED RDW RBC AUTO: 41.6 FL (ref 37–54)
EGFRCR SERPLBLD CKD-EPI 2021: 87.8 ML/MIN/1.73
ERYTHROCYTE [DISTWIDTH] IN BLOOD BY AUTOMATED COUNT: 12 % (ref 12.3–15.4)
GLUCOSE SERPL-MCNC: 98 MG/DL (ref 65–99)
HCT VFR BLD AUTO: 34.9 % (ref 37.5–51)
HGB BLD-MCNC: 12.2 G/DL (ref 13–17.7)
MAGNESIUM SERPL-MCNC: 1.9 MG/DL (ref 1.6–2.4)
MCH RBC QN AUTO: 32.9 PG (ref 26.6–33)
MCHC RBC AUTO-ENTMCNC: 35 G/DL (ref 31.5–35.7)
MCV RBC AUTO: 94.1 FL (ref 79–97)
PHOSPHATE SERPL-MCNC: 2.3 MG/DL (ref 2.5–4.5)
PLATELET # BLD AUTO: 258 10*3/MM3 (ref 140–450)
PMV BLD AUTO: 9.9 FL (ref 6–12)
POTASSIUM SERPL-SCNC: 4.1 MMOL/L (ref 3.5–5.2)
RBC # BLD AUTO: 3.71 10*6/MM3 (ref 4.14–5.8)
SODIUM SERPL-SCNC: 136 MMOL/L (ref 136–145)
WBC NRBC COR # BLD AUTO: 15.37 10*3/MM3 (ref 3.4–10.8)

## 2024-06-13 PROCEDURE — 83735 ASSAY OF MAGNESIUM: CPT | Performed by: INTERNAL MEDICINE

## 2024-06-13 PROCEDURE — 99232 SBSQ HOSP IP/OBS MODERATE 35: CPT | Performed by: SPECIALIST

## 2024-06-13 PROCEDURE — 25810000003 SODIUM CHLORIDE 0.9 % SOLUTION: Performed by: INTERNAL MEDICINE

## 2024-06-13 PROCEDURE — 80048 BASIC METABOLIC PNL TOTAL CA: CPT | Performed by: INTERNAL MEDICINE

## 2024-06-13 PROCEDURE — 84100 ASSAY OF PHOSPHORUS: CPT | Performed by: PHYSICIAN ASSISTANT

## 2024-06-13 PROCEDURE — 99239 HOSP IP/OBS DSCHRG MGMT >30: CPT | Performed by: INTERNAL MEDICINE

## 2024-06-13 PROCEDURE — 85027 COMPLETE CBC AUTOMATED: CPT | Performed by: INTERNAL MEDICINE

## 2024-06-13 PROCEDURE — 99024 POSTOP FOLLOW-UP VISIT: CPT | Performed by: SURGERY

## 2024-06-13 RX ORDER — CARVEDILOL 6.25 MG/1
6.25 TABLET ORAL 2 TIMES DAILY WITH MEALS
Status: DISCONTINUED | OUTPATIENT
Start: 2024-06-13 | End: 2024-06-13 | Stop reason: HOSPADM

## 2024-06-13 RX ORDER — CARVEDILOL 6.25 MG/1
6.25 TABLET ORAL 2 TIMES DAILY WITH MEALS
Qty: 30 TABLET | Refills: 0 | Status: SHIPPED | OUTPATIENT
Start: 2024-06-13

## 2024-06-13 RX ORDER — LOSARTAN POTASSIUM 50 MG/1
25 TABLET ORAL
Status: DISCONTINUED | OUTPATIENT
Start: 2024-06-13 | End: 2024-06-13

## 2024-06-13 RX ORDER — AMLODIPINE BESYLATE 5 MG/1
5 TABLET ORAL
Qty: 30 TABLET | Refills: 0 | Status: SHIPPED | OUTPATIENT
Start: 2024-06-14

## 2024-06-13 RX ORDER — AMLODIPINE BESYLATE 5 MG/1
5 TABLET ORAL
Status: DISCONTINUED | OUTPATIENT
Start: 2024-06-13 | End: 2024-06-13 | Stop reason: HOSPADM

## 2024-06-13 RX ADMIN — CARVEDILOL 6.25 MG: 6.25 TABLET, FILM COATED ORAL at 09:28

## 2024-06-13 RX ADMIN — LEVOTHYROXINE SODIUM 137 MCG: 137 TABLET ORAL at 05:07

## 2024-06-13 RX ADMIN — AMLODIPINE BESYLATE 5 MG: 5 TABLET ORAL at 09:28

## 2024-06-13 RX ADMIN — Medication 10 ML: at 09:28

## 2024-06-13 RX ADMIN — SODIUM CHLORIDE 100 ML/HR: 9 INJECTION, SOLUTION INTRAVENOUS at 05:05

## 2024-06-13 RX ADMIN — PANTOPRAZOLE SODIUM 40 MG: 40 TABLET, DELAYED RELEASE ORAL at 05:07

## 2024-06-13 NOTE — DISCHARGE SUMMARY
"  [facility] Medicine Services  DISCHARGE SUMMARY        Date of Admission: 6/12/2024    Date of Discharge:  6/13/2024    Length of stay:  LOS: 1 day     Presenting Problem:   Calculus of gallbladder without cholecystitis without obstruction [K80.20]  Bradycardia [R00.1]    Hospital Course     Active Diagnosis During Hospital Stay/Discharge Diagnoses/Course by Diagnoses:    Bradycardia  -likely related to rate lowering medications  -EKG reviewed  -stopped cardizem  -continue lower dose coreg (pts reports h/o \"arrythmia\")  -Echo revealed normal EF.  -cardiology follow up     HTN  -BP stable  -started amlodipine and if further agents needed outpt would rec ARB     Hypomagnesesmia, Hyponatremia  -replaced      S/p Lap nissa  -will have surgery follow up outpt     Hypothyroidism  -chronic continue home synthroid     MYRANDA  -per hx but reports does not use CPAP       Active Hospital Problems    Diagnosis  POA    **Calculus of gallbladder without cholecystitis without obstruction [K80.20]  Unknown      Resolved Hospital Problems    Diagnosis Date Resolved POA    Bradycardia [R00.1] 06/13/2024 Yes         Hospital Course  Patient is a 68 y.o. male presented with bradycardia. He initially managed in the ICU on dopamine drip but this was quickly titrated down.  After holding rate controlling medications his heart rate significantly improved.  Next day he was started on low-dose Coreg and heart rate remained stable.  This point he was felt stable to discharge home and will have outpatient follow-up with cardiology.      Procedures Performed:as noted     Procedure(s):  CHOLECYSTECTOMY LAPAROSCOPIC WITH DAVINCI ROBOT  -------------------       Consults:   Consults       Date and Time Order Name Status Description    6/12/2024  1:15 PM Inpatient Cardiology Consult      6/12/2024  1:15 PM Inpatient General Surgery Consult      6/12/2024 10:25 AM Inpatient Pulmonology Consult Completed               Pertinent  and/or " Most Recent Results       Pertinent Test Results:     Results from last 7 days   Lab Units 06/13/24  0333 06/12/24  0943   WBC 10*3/mm3 15.37* 22.46*   HEMOGLOBIN g/dL 12.2* 13.4   HEMATOCRIT % 34.9* 39.0   PLATELETS 10*3/mm3 258 173     Results from last 7 days   Lab Units 06/13/24  0333 06/12/24  1146 06/12/24  0943   SODIUM mmol/L 136 132* 129*   POTASSIUM mmol/L 4.1 4.2 4.6   CHLORIDE mmol/L 104 101 98   CO2 mmol/L 19.5* 17.3* 16.2*   BUN mg/dL 16 23 21   CREATININE mg/dL 0.94 1.35* 1.26   CALCIUM mg/dL 8.3* 8.8 8.7   BILIRUBIN mg/dL  --   --  0.4   ALK PHOS U/L  --   --  34*   ALT (SGPT) U/L  --   --  27   AST (SGOT) U/L  --   --  35   GLUCOSE mg/dL 98 157* 170*       Microbiology Results (last 10 days)       ** No results found for the last 240 hours. **            Results for orders placed during the hospital encounter of 06/12/24    Adult Transthoracic Echo Complete W/ Cont if Necessary Per Protocol    Interpretation Summary  Normal left ventricular systolic function.  No significant valve abnormalities noted.      Imaging Results (All)       Procedure Component Value Units Date/Time    XR Chest 1 View [[id number]] Collected: 06/12/24 0949     Updated: 06/12/24 0952    Narrative:      XR CHEST 1 VW    Date of Exam: 6/12/2024 9:20 AM EDT    Indication: DYSPNEA    Comparison: 9/21/2015    Findings:  Heart size and pulmonary vessels are within normal limits. There is some minimal bibasilar atelectasis. The lungs are otherwise clear. No pleural effusion or pneumothorax. Bony structures are unremarkable. Patient is noted be status post anterior fusion   of the lower cervical spine.      Impression:      Impression:    1. Minimal bibasilar atelectasis. No other acute cardiopulmonary disease identified.      Electronically Signed: Demian Boss MD    6/12/2024 9:50 AM EDT    Workstation ID: [id number]              Day of Discharge       Condition on Discharge:  stable     Vital Signs  Temp:  [97.6 °F (36.4 °C)-98.2  °F (36.8 °C)] 98.1 °F (36.7 °C)  Heart Rate:  [] 66  Resp:  [15-24] 15  BP: (119-170)/() 153/67    Physical Exam:  Physical Exam  Vitals reviewed.   Cardiovascular:      Rate and Rhythm: Normal rate.   Pulmonary:      Effort: No respiratory distress.   Abdominal:      General: There is no distension.      Palpations: Abdomen is soft.   Skin:     General: Skin is warm.   Neurological:      Mental Status: He is alert and oriented to person, place, and time.             Discharge Disposition  Home or Self Care    Discharge Medications     Discharge Medications        New Medications        Instructions Start Date   amLODIPine 5 MG tablet  Commonly known as: NORVASC   5 mg, Oral, Every 24 Hours Scheduled   Start Date: June 14, 2024     HYDROcodone-acetaminophen 5-325 MG per tablet  Commonly known as: NORCO   1 tablet, Oral, Every 6 Hours PRN             Changes to Medications        Instructions Start Date   carvedilol 6.25 MG tablet  Commonly known as: COREG  What changed:   medication strength  how much to take   6.25 mg, Oral, 2 Times Daily With Meals             Continue These Medications        Instructions Start Date   allopurinol 300 MG tablet  Commonly known as: ZYLOPRIM   300 mg, Oral, Nightly, FOR KIDNEY STONES      aspirin 81 MG tablet   81 mg, Oral, Daily, REPORTS TAKES FOR HEART HEALTH. PT REPORTS THAT HE IS STOPPING TAKING ON HIS OWN WITH LAST DOSE BEING 6/9/24      fenofibrate 160 MG tablet   1 tablet, Oral, Every Evening      fish oil 1000 MG capsule capsule   1,000 mg, Oral, Daily With Breakfast,  INSTRUCTED PER ANESTHESIA PROTOCOL       hydroCHLOROthiazide 12.5 MG tablet   12.5 mg, Oral, Daily      levothyroxine 137 MCG tablet  Commonly known as: SYNTHROID, LEVOTHROID   137 mcg, Oral, Daily      multivitamin with minerals tablet tablet   1 tablet, Oral, Daily,  INSTRUCTED PER ANESTHESIA PROTOCOL       pantoprazole 40 MG EC tablet  Commonly known as: PROTONIX   40 mg, Oral, Daily              Stop These Medications      dilTIAZem 300 MG 24 hr capsule  Commonly known as: TIAZAC              Discharge Diet:   Diet Instructions       Advance Diet As Tolerated -Target Diet: home diet      Target Diet: home diet            Activity at Discharge:   Activity Instructions       Activity as Tolerated      Activity as Tolerated              Follow-up Appointments  Future Appointments   Date Time Provider Department Center   6/25/2024  1:15 PM Maik Purcell MD [facility]     Additional Instructions for the Follow-ups that You Need to Schedule       Discharge Follow-up with PCP   As directed       Currently Documented PCP:    Sushant Stack MD    PCP Phone Number:    [phone]     Follow Up Details: one week        Discharge Follow-up with Specified Provider: Dr. Purcell; 2 Weeks   As directed      To: Dr. Purcell   Follow Up: 2 Weeks        Discharge Follow-up with Specified Provider: cardiology; 2 Weeks   As directed      To: cardiology   Follow Up: 2 Weeks        Notify Physician or Go To The ED For the Following Conditions   As directed      -Severe Pain  -Excessive Bleeding  -Fever over 102    Order Comments: -Severe Pain -Excessive Bleeding -Fever over 102                 Test Results Pending at Discharge  Pending Labs       Order Current Status    Tissue Pathology Exam In process             Risk for Readmission (LACE) Score: 4 (6/13/2024  6:00 AM)        Time: Discharge 32 min with face-to-face history exam, writing of prescriptions, and documenting discharge data including care coordination with the nursing staff.      Electronically signed by Julito Muñoz DO, 06/13/24, 12:29 EDT.      Note disclaimer: At [facility], we believe that sharing information builds trust and better relationships. You are receiving this note because you recently visited [facility]. It is possible you will see health information before a provider has talked with you about it. This kind of information  can be easy to misunderstand. To help you fully understand what it means for your health, we urge you to discuss this note with your provider.

## 2024-06-13 NOTE — PROGRESS NOTES
[facility]   Cardiology Progress Note      Patient Name: Ramesh Garcia  : 1955  MRN: [id number]  Primary Care Physician:  Sushant Stack MD  Referring Physician: Maik Purcell MD  Date of admission: 2024    Subjective   Subjective     Chief Complaint: Alert oriented no distress.    HPI:  Ramesh Garcia is a 68 y.o. male status post cholecystectomy had bradycardia which is now resolved.    REVIEW OF SYSTEMS    Constitutional:    No fever, no weight loss  Skin:     No rash  Otolaryngeal:    No difficulty swallowing  Cardiovascular:  No chest pain or shortness of breath  Pulmonary:    No cough, no sputum production    Objective    Objective     Vitals:   Vitals:    24 0300 24 0400 24 0500 24 0600   BP: 146/77 148/76 155/82 166/78   Pulse: 85 80 88 89   Resp:       Temp:       TempSrc:       SpO2: 91% 93% 93% 94%   Weight:       Height:                Physical Exam:   Constitutional: Awake, alert, No acute distress    Eyes: PERRLA, sclerae anicteric, no conjunctival injection   HENT: NCAT, mucous membranes moist   Neck: Supple, no thyromegaly, no lymphadenopathy, trachea midline   Respiratory: Clear to auscultation bilaterally, nonlabored respirations    Cardiovascular: RRR, no murmurs, rubs, or gallops, palpable pedal pulses bilaterally   Gastrointestinal: Positive bowel sounds, soft, nontender, nondistended   Musculoskeletal: No bilateral ankle edema, no clubbing or cyanosis to extremities   Psychiatric: Appropriate affect, cooperative   Neurologic: Oriented x 3, strength symmetric in all extremities, Cranial Nerves grossly intact to confrontation, speech clear   Skin: No rashes.      Current medications:  allopurinol, 300 mg, Oral, Nightly  amLODIPine, 5 mg, Oral, Q24H  carvedilol, 6.25 mg, Oral, BID With Meals  levothyroxine, 137 mcg, Oral, Q AM  pantoprazole, 40 mg, Oral, QAM  sodium chloride, 10 mL, Intravenous, Q12H      Current IV drips:       Result Review   "  Result Review:  I have personally reviewed the results from the time of this admission to 6/13/2024 09:00 EDT and agree with these findings:  []  Laboratory  []  EKG/Telemetry   []  Cardiology/Vascular   []  Radiology         CBC          6/12/2024    09:43 6/13/2024    03:33   CBC   WBC 22.46  15.37    RBC 4.06  3.71    Hemoglobin 13.4  12.2    Hematocrit 39.0  34.9    MCV 96.1  94.1    MCH 33.0  32.9    MCHC 34.4  35.0    RDW 12.4  12.0    Platelets 173  258      CMP          4/18/2024    11:26 6/12/2024    06:16 6/12/2024    09:43 6/12/2024    11:46 6/13/2024    03:33   CMP   Glucose  117  170  157  98    BUN  21  21  23  16    Creatinine 1.30  1.15  1.26  1.35  0.94    EGFR 59.5  68.9  61.7  56.8  87.8    Sodium  134  129  132  136    Potassium  4.0  4.6  4.2  4.1    Chloride  99  98  101  104    Calcium  9.5  8.7  8.8  8.3    Total Protein   5.8      Albumin   3.6      Globulin   2.2      Total Bilirubin   0.4      Alkaline Phosphatase   34      AST (SGOT)   35      ALT (SGPT)   27      Albumin/Globulin Ratio   1.6      BUN/Creatinine Ratio  18.3  16.7  17.0  17.0    Anion Gap  12.9  14.8  13.7  12.5      Results for orders placed during the hospital encounter of 06/12/24    Adult Transthoracic Echo Complete W/ Cont if Necessary Per Protocol    Interpretation Summary  Normal left ventricular systolic function.  No significant valve abnormalities noted.        No results found for: \"PROBNP\"      Telemetry reviewed shows sinus rhythm    Assessment / Plan     ASSESSMENT:    Calculus of gallbladder without cholecystitis without obstruction    Bradycardia  Bradycardia probably secondary to medications.      PLAN:  1.  DC Cardizem on discharge.  2.  Continue carvedilol and add amlodipine for blood pressure control.  3.  Use ARB's and discontinue carvedilol if if needed.  4.  Will sign off and see as an outpatient.    Electronically signed by Aydin Allen MD, 06/13/24, 9:00 AM EDT.             "

## 2024-06-13 NOTE — OUTREACH NOTE
Prep Survey      Flowsheet Row Responses   [facility] [place] facility patient discharged from? [facility]   Is LACE score < 7 ? Yes   Eligibility Not Eligible   What are the reasons patient is not eligible? Other  [low risk for readmit]   Does the patient have one of the following disease processes/diagnoses(primary or secondary)? Other   Prep survey completed? Yes            Gaby WEBSTER - Registered Nurse

## 2024-06-13 NOTE — CASE MANAGEMENT/SOCIAL WORK
"IP:  6/12/24 (Met IP criteria)  DC:  6/13/24  LOS:  < 2MN  Exception: N/A - per DC Summary, \"He initially managed in the ICU on dopamine drip but this was quickly titrated down. After holding rate controlling medications his heart rate significantly improved.\"  "

## 2024-06-13 NOTE — PLAN OF CARE
Goal Outcome Evaluation:      VSS. No pain reported this shift. Sinus rhythm 70's-80's.

## 2024-06-13 NOTE — PROGRESS NOTES
[facility]     Progress Note    Patient Name: Ramesh Garcia  : 1955  MRN: [id number]  Primary Care Physician:  Sushant Stack MD  Date of admission: 2024    Subjective   Subjective     HPI:  Patient Reports feeling better today.    Review of Systems    Objective   Objective     Vitals:   Temp:  [97.6 °F (36.4 °C)-98.2 °F (36.8 °C)] 98.1 °F (36.7 °C)  Heart Rate:  [] 66  Resp:  [15-24] 15  BP: (119-170)/() 153/67  Flow (L/min):  [4] 4    Physical Exam   He appears well.  His abdomen is soft.  Meds:  Scheduled Meds:allopurinol, 300 mg, Oral, Nightly  amLODIPine, 5 mg, Oral, Q24H  carvedilol, 6.25 mg, Oral, BID With Meals  levothyroxine, 137 mcg, Oral, Q AM  pantoprazole, 40 mg, Oral, QAM  sodium chloride, 10 mL, Intravenous, Q12H      Continuous Infusions:   PRN Meds:.  acetaminophen **OR** acetaminophen **OR** acetaminophen    senna-docusate sodium **AND** polyethylene glycol **AND** bisacodyl **AND** bisacodyl    nitroglycerin    ondansetron ODT **OR** ondansetron    sodium chloride    sodium chloride     Result Review    Result Review:  I have personally reviewed the results from the time of this admission to 2024 12:36 EDT and agree with these findings:  []  Laboratory  []  Microbiology  []  Radiology  []  EKG/Telemetry   []  Cardiology/Vascular   []  Pathology  []  Old records  []  Other:  Most notable findings include:     Assessment & Plan   Assessment / Plan     Brief Patient Summary:  Ramesh Garcia is a 68 y.o. male who is status post a robotic cholecystectomy yesterday.  He had some bradycardia following the procedure but that appears better now.    Active Hospital Problems:  Active Hospital Problems    Diagnosis     **Calculus of gallbladder without cholecystitis without obstruction        Plan:   I think he can probably be discharged to home.  I will see him back in the office in 2 weeks.    VTE Prophylaxis:  Mechanical VTE prophylaxis orders are  present.        CODE STATUS:    Code Status (Patient has no pulse and is not breathing): CPR (Attempt to Resuscitate)  Medical Interventions (Patient has pulse or is breathing): Full Support      Electronically signed by Maik Purcell MD, 06/13/24, 12:36 PM EDT.

## 2024-06-14 LAB
CYTO UR: NORMAL
LAB AP CASE REPORT: NORMAL
LAB AP CLINICAL INFORMATION: NORMAL
PATH REPORT.FINAL DX SPEC: NORMAL
PATH REPORT.GROSS SPEC: NORMAL

## 2024-06-25 ENCOUNTER — OFFICE VISIT (OUTPATIENT)
Dept: SURGERY | Facility: CLINIC | Age: 69
End: 2024-06-25
Payer: MEDICARE

## 2024-06-25 VITALS — BODY MASS INDEX: 29.96 KG/M2 | RESPIRATION RATE: 14 BRPM | HEIGHT: 71 IN | WEIGHT: 214 LBS

## 2024-06-25 DIAGNOSIS — K80.20 CALCULUS OF GALLBLADDER WITHOUT CHOLECYSTITIS WITHOUT OBSTRUCTION: Primary | ICD-10-CM

## 2024-06-25 PROCEDURE — 1159F MED LIST DOCD IN RCRD: CPT | Performed by: SURGERY

## 2024-06-25 PROCEDURE — 99024 POSTOP FOLLOW-UP VISIT: CPT | Performed by: SURGERY

## 2024-06-25 PROCEDURE — 1160F RVW MEDS BY RX/DR IN RCRD: CPT | Performed by: SURGERY

## 2024-06-25 NOTE — PROGRESS NOTES
Chief Complaint  Cholelithiasis and Post-op    Subjective          Ramesh Garcia presents to [facility] GENERAL SURGERY  History of Present Illness    Ramesh Garcia is a 68 y.o. male  who presents today for a postoperative visit.     Patient is here for a follow-up after a recent robotic cholecystectomy.  After surgery he had some postop bradycardia and required admission overnight.  He was seen by cardiology and had some of his outpatient medications adjusted.  He is feeling better now.  He is still having a little bit of queasiness with certain foods but his abdomen feels okay.    Past History:  Medical History: has a past medical history of Calculus of gallbladder, Cancer of kidney, Cholelithiasis, Concussion, GERD (gastroesophageal reflux disease), History of transfusion, Hyperlipidemia, Hypertension, Hypothyroidism, Kidney stones, Pain management, and Sleep apnea.   Surgical History: has a past surgical history that includes Elbow surgery (Left); Neck surgery (10/23/2015); Shoulder surgery (Bilateral); Colonoscopy (N/A, 11/14/2022); Lumbar disc surgery; Kidney stone surgery; cystoscopy ureteroscopy (Right, 01/19/2023); Nephrectomy (Left, 2006); and Cholecystectomy (N/A, 6/12/2024).   Family History: family history includes Breast cancer in his mother, paternal grandmother, and sister; Heart attack in his father; Hypertension in his father and mother.   Social History: reports that he has quit smoking. He has quit using smokeless tobacco. He reports that he does not drink alcohol and does not use drugs.  Allergies: Patient has no known allergies.       Current Outpatient Medications:     allopurinol (ZYLOPRIM) 300 MG tablet, Take 1 tablet by mouth Every Night. FOR KIDNEY STONES, Disp: , Rfl:     amLODIPine (NORVASC) 5 MG tablet, Take 1 tablet by mouth Daily., Disp: 30 tablet, Rfl: 0    aspirin 81 MG tablet, Take 1 tablet by mouth Daily. REPORTS TAKES FOR HEART HEALTH. PT REPORTS THAT HE  "IS STOPPING TAKING ON HIS OWN WITH LAST DOSE BEING 6/9/24, Disp: , Rfl:     carvedilol (COREG) 6.25 MG tablet, Take 1 tablet by mouth 2 (Two) Times a Day With Meals., Disp: 30 tablet, Rfl: 0    fenofibrate 160 MG tablet, Take 1 tablet by mouth Every Evening., Disp: , Rfl:     hydroCHLOROthiazide (HYDRODIURIL) 12.5 MG tablet, Take 1 tablet by mouth Daily., Disp: , Rfl:     HYDROcodone-acetaminophen (NORCO) 5-325 MG per tablet, Take 1 tablet by mouth Every 6 (Six) Hours As Needed (Pain)., Disp: 10 tablet, Rfl: 0    levothyroxine (SYNTHROID, LEVOTHROID) 137 MCG tablet, Take 1 tablet by mouth Daily., Disp: , Rfl:     multivitamin with minerals tablet tablet, Take 1 tablet by mouth Daily.  INSTRUCTED PER ANESTHESIA PROTOCOL, Disp: , Rfl:     Omega-3 Fatty Acids (fish oil) 1000 MG capsule capsule, Take 1 capsule by mouth Daily With Breakfast.  INSTRUCTED PER ANESTHESIA PROTOCOL, Disp: , Rfl:     pantoprazole (PROTONIX) 40 MG EC tablet, Take 1 tablet by mouth Daily., Disp: , Rfl:        Physical Exam  His incisions all look good there is no evidence of infection today.  Objective     Vital Signs:   Resp 14   Ht 180.3 cm (70.98\")   Wt 97.1 kg (214 lb)   BMI 29.86 kg/m²              Assessment and Plan    Diagnoses and all orders for this visit:    1. Calculus of gallbladder without cholecystitis without obstruction (Primary)    At this point he seems to be doing well from his surgery.  He will have follow-up with Dr. Allen of cardiology next week.  I will see him back on an as-needed basis.      "

## 2024-06-27 NOTE — PROGRESS NOTES
[facility]  Cardiology progress Note    Patient Name: Ramesh Garcia  : 1955    CHIEF COMPLAINT  Bradycardia        Subjective   Subjective     HISTORY OF PRESENT ILLNESS    Ramesh Garcia is a 68 y.o. male with history of bradycardia.  Which is improved.    REVIEW OF SYSTEMS    Constitutional:    No fever, no weight loss  Skin:     No rash  Otolaryngeal:    No difficulty swallowing  Cardiovascular: See HPI.  Pulmonary:    No cough, no sputum production    Personal History     Social History:    reports that he quit smoking about 33 years ago. His smoking use included cigarettes. He has quit using smokeless tobacco. He reports that he does not drink alcohol and does not use drugs.    Home Medications:  Current Outpatient Medications on File Prior to Visit   Medication Sig    allopurinol (ZYLOPRIM) 300 MG tablet Take 1 tablet by mouth Every Night. FOR KIDNEY STONES    amLODIPine (NORVASC) 5 MG tablet Take 1 tablet by mouth Daily.    aspirin 81 MG tablet Take 1 tablet by mouth Daily. REPORTS TAKES FOR HEART [employer]. PT REPORTS THAT HE IS STOPPING TAKING ON HIS OWN WITH LAST DOSE BEING 23    carvedilol (COREG) 6.25 MG tablet Take 1 tablet by mouth 2 (Two) Times a Day With Meals.    fenofibrate 160 MG tablet Take 1 tablet by mouth Every Evening.    hydroCHLOROthiazide (HYDRODIURIL) 12.5 MG tablet Take 1 tablet by mouth Daily.    levothyroxine (SYNTHROID, LEVOTHROID) 137 MCG tablet Take 1 tablet by mouth Daily.    multivitamin with minerals tablet tablet Take 1 tablet by mouth Daily.  INSTRUCTED PER ANESTHESIA PROTOCOL    Omega-3 Fatty Acids (fish oil) 1000 MG capsule capsule Take 1 capsule by mouth Daily With Breakfast.  INSTRUCTED PER ANESTHESIA PROTOCOL    pantoprazole (PROTONIX) 40 MG EC tablet Take 1 tablet by mouth Daily.    [DISCONTINUED] HYDROcodone-acetaminophen (NORCO) 5-325 MG per tablet Take 1 tablet by mouth Every 6 (Six) Hours As Needed (Pain).     No current  facility-administered medications on file prior to visit.       Past Medical History:   Diagnosis Date    Calculus of gallbladder     Cancer of kidney     LEFT/KIDNEY REMOVED    Cholelithiasis     Concussion     GERD (gastroesophageal reflux disease)     History of transfusion     REPORTS THAT WHEN HAD KIDNEY REMOVED USED HIS BLOOD FOR TRANSFUSION POST SURGERY    Hyperlipidemia     Hypertension     Hypothyroidism     Kidney stones     Pain management     [facility]    Sleep apnea     NO CPAP       Allergies:  No Known Allergies    Objective    Objective       Vitals:   Heart Rate:  [68] 68  BP: (158)/(80) 158/80  Body mass index is 29.85 kg/m².     PHYSICAL EXAM:    General Appearance:   well developed  well nourished  HENT:   oropharynx moist  lips not cyanotic  Neck:  thyroid not enlarged  supple  Respiratory:  no respiratory distress  normal breath sounds  no rales  Cardiovascular:  no jugular venous distention  regular rhythm  apical impulse normal  S1 normal, S2 normal  no S3, no S4   no murmur  no rub, no thrill  carotid pulses normal; no bruit  pedal pulses normal  lower extremity edema: none    Skin:   warm, dry  Psychiatric:  judgement and insight appropriate  normal mood and affect        Result Review:  I have personally reviewed the available results from  [x]  Laboratory  [x]  EKG  [x]  Cardiology  [x]  Medications  [x]  Old records  []  Other:     Procedures    Results for orders placed during the hospital encounter of 06/12/24    Adult Transthoracic Echo Complete W/ Cont if Necessary Per Protocol    Interpretation Summary  Normal left ventricular systolic function.  No significant valve abnormalities noted.     Impression/Plan:  1.  Asymptomatic bradycardia: Improved.  Normal echo.  Avoid Cardizem and verapamil.  2.  Essential hypertension uncontrolled: Continue carvedilol 6.25 mg twice a day.  Continue amlodipine 5 mg once a day.  Add Diovan 160 mg once a day.  Can discontinue carvedilol when blood  pressure is controlled.           Aydin Allen MD   07/02/24   15:07 EDT

## 2024-07-02 ENCOUNTER — OFFICE VISIT (OUTPATIENT)
Dept: CARDIOLOGY | Facility: CLINIC | Age: 69
End: 2024-07-02
Payer: MEDICARE

## 2024-07-02 VITALS
BODY MASS INDEX: 29.96 KG/M2 | HEART RATE: 68 BPM | DIASTOLIC BLOOD PRESSURE: 80 MMHG | SYSTOLIC BLOOD PRESSURE: 158 MMHG | WEIGHT: 214 LBS | HEIGHT: 71 IN

## 2024-07-02 DIAGNOSIS — I10 HYPERTENSION, ESSENTIAL: ICD-10-CM

## 2024-07-02 DIAGNOSIS — R00.1 BRADYCARDIA, SINUS: Primary | ICD-10-CM

## 2024-07-02 RX ORDER — VALSARTAN 160 MG/1
160 TABLET ORAL DAILY
Qty: 90 TABLET | Refills: 3 | Status: SHIPPED | OUTPATIENT
Start: 2024-07-02

## 2024-07-02 NOTE — LETTER
2024     Sushant Stack MD  [address] [place] [address]    Patient: Ramesh Garcia   YOB: 1955   Date of Visit: 2024       Dear Sushant Stack MD    Ramesh Garcia was in my office today. Below is a copy of my note.    If you have questions, please do not hesitate to call me. I look forward to following Ramesh along with you.         Sincerely,        Aydin Allen MD        CC: MD Kyra Booth APRJEY      [facility]  Cardiology progress Note    Patient Name: Ramesh Garcia  : 1955    CHIEF COMPLAINT  Bradycardia        Subjective  Subjective     HISTORY OF PRESENT ILLNESS    Ramesh Garcia is a 68 y.o. male with history of bradycardia.  Which is improved.    REVIEW OF SYSTEMS    Constitutional:    No fever, no weight loss  Skin:     No rash  Otolaryngeal:    No difficulty swallowing  Cardiovascular: See HPI.  Pulmonary:    No cough, no sputum production    Personal History     Social History:    reports that he quit smoking about 33 years ago. His smoking use included cigarettes. He has quit using smokeless tobacco. He reports that he does not drink alcohol and does not use drugs.    Home Medications:  Current Outpatient Medications on File Prior to Visit   Medication Sig   • allopurinol (ZYLOPRIM) 300 MG tablet Take 1 tablet by mouth Every Night. FOR KIDNEY STONES   • amLODIPine (NORVASC) 5 MG tablet Take 1 tablet by mouth Daily.   • aspirin 81 MG tablet Take 1 tablet by mouth Daily. REPORTS TAKES FOR HEART HEALTH. PT REPORTS THAT HE IS STOPPING TAKING ON HIS OWN WITH LAST DOSE BEING 23   • carvedilol (COREG) 6.25 MG tablet Take 1 tablet by mouth 2 (Two) Times a Day With Meals.   • fenofibrate 160 MG tablet Take 1 tablet by mouth Every Evening.   • hydroCHLOROthiazide (HYDRODIURIL) 12.5 MG tablet Take 1 tablet by mouth Daily.   • levothyroxine (SYNTHROID, LEVOTHROID) 137 MCG tablet Take 1 tablet by mouth Daily.   •  multivitamin with minerals tablet tablet Take 1 tablet by mouth Daily.  INSTRUCTED PER ANESTHESIA PROTOCOL   • Omega-3 Fatty Acids (fish oil) 1000 MG capsule capsule Take 1 capsule by mouth Daily With Breakfast.  INSTRUCTED PER ANESTHESIA PROTOCOL   • pantoprazole (PROTONIX) 40 MG EC tablet Take 1 tablet by mouth Daily.   • [DISCONTINUED] HYDROcodone-acetaminophen (NORCO) 5-325 MG per tablet Take 1 tablet by mouth Every 6 (Six) Hours As Needed (Pain).     No current facility-administered medications on file prior to visit.       Past Medical History:   Diagnosis Date   • Calculus of gallbladder    • Cancer of kidney     LEFT/KIDNEY REMOVED   • Cholelithiasis    • Concussion    • GERD (gastroesophageal reflux disease)    • History of transfusion     REPORTS THAT WHEN HAD KIDNEY REMOVED USED HIS BLOOD FOR TRANSFUSION POST SURGERY   • Hyperlipidemia    • Hypertension    • Hypothyroidism    • Kidney stones    • Pain management     [facility]   • Sleep apnea     NO CPAP       Allergies:  No Known Allergies    Objective   Objective       Vitals:   Heart Rate:  [68] 68  BP: (158)/(80) 158/80  Body mass index is 29.85 kg/m².     PHYSICAL EXAM:    General Appearance:   well developed  well nourished  HENT:   oropharynx moist  lips not cyanotic  Neck:  thyroid not enlarged  supple  Respiratory:  no respiratory distress  normal breath sounds  no rales  Cardiovascular:  no jugular venous distention  regular rhythm  apical impulse normal  S1 normal, S2 normal  no S3, no S4   no murmur  no rub, no thrill  carotid pulses normal; no bruit  pedal pulses normal  lower extremity edema: none    Skin:   warm, dry  Psychiatric:  judgement and insight appropriate  normal mood and affect        Result Review:  I have personally reviewed the available results from  [x]  Laboratory  [x]  EKG  [x]  Cardiology  [x]  Medications  [x]  Old records  []  Other:     Procedures    Results for orders placed during the hospital encounter of  06/12/24    Adult Transthoracic Echo Complete W/ Cont if Necessary Per Protocol    Interpretation Summary  Normal left ventricular systolic function.  No significant valve abnormalities noted.     Impression/Plan:  1.  Asymptomatic bradycardia: Improved.  Normal echo.  Avoid Cardizem and verapamil.  2.  Essential hypertension uncontrolled: Continue carvedilol 6.25 mg twice a day.  Continue amlodipine 5 mg once a day.  Add Diovan 160 mg once a day.  Can discontinue carvedilol when blood pressure is controlled.           Aydin Allen MD   07/02/24   15:07 EDT

## 2024-09-17 ENCOUNTER — TRANSCRIBE ORDERS (OUTPATIENT)
Dept: ADMINISTRATIVE | Facility: HOSPITAL | Age: 69
End: 2024-09-17
Payer: MEDICARE

## 2024-09-17 DIAGNOSIS — M25.511 BILATERAL SHOULDER PAIN, UNSPECIFIED CHRONICITY: Primary | ICD-10-CM

## 2024-09-17 DIAGNOSIS — M25.512 BILATERAL SHOULDER PAIN, UNSPECIFIED CHRONICITY: Primary | ICD-10-CM

## 2024-10-02 ENCOUNTER — HOSPITAL ENCOUNTER (OUTPATIENT)
Dept: GENERAL RADIOLOGY | Facility: HOSPITAL | Age: 69
Discharge: HOME | End: 2024-10-02
Payer: MEDICARE

## 2024-10-02 DIAGNOSIS — M25.511 BILATERAL SHOULDER PAIN, UNSPECIFIED CHRONICITY: ICD-10-CM

## 2024-10-02 DIAGNOSIS — M25.512 BILATERAL SHOULDER PAIN, UNSPECIFIED CHRONICITY: ICD-10-CM

## 2024-10-02 PROCEDURE — 73030 X-RAY EXAM OF SHOULDER: CPT

## 2025-01-11 NOTE — PROGRESS NOTES
[facility]  Cardiology progress Note    Patient Name: Ramesh Garcia  : 1955    CHIEF COMPLAINT  Bradycardia        Subjective   Subjective     HISTORY OF PRESENT ILLNESS    Ramesh Garcia is a 69 y.o. male with history of asymptomatic bradycardia.  He has some occasional palpitations which has got better    REVIEW OF SYSTEMS    Constitutional:    No fever, no weight loss  Skin:     No rash  Otolaryngeal:    No difficulty swallowing  Cardiovascular: See HPI.  Pulmonary:    No cough, no sputum production    Personal History     Social History:    reports that he quit smoking about 34 years ago. His smoking use included cigarettes. He has quit using smokeless tobacco. He reports that he does not drink alcohol and does not use drugs.    Home Medications:  Current Outpatient Medications on File Prior to Visit   Medication Sig    allopurinol (ZYLOPRIM) 300 MG tablet Take 1 tablet by mouth Every Night. FOR KIDNEY STONES    amLODIPine (NORVASC) 5 MG tablet Take 1 tablet by mouth Daily.    aspirin 81 MG tablet Take 1 tablet by mouth Daily. REPORTS TAKES FOR HEART [employer]. PT REPORTS THAT HE IS STOPPING TAKING ON HIS OWN WITH LAST DOSE BEING 23    carvedilol (COREG) 6.25 MG tablet Take 1 tablet by mouth 2 (Two) Times a Day With Meals.    fenofibrate 160 MG tablet Take 1 tablet by mouth Every Evening.    hydroCHLOROthiazide (HYDRODIURIL) 12.5 MG tablet Take 1 tablet by mouth Daily.    levothyroxine (SYNTHROID, LEVOTHROID) 137 MCG tablet Take 1 tablet by mouth Daily.    multivitamin with minerals tablet tablet Take 1 tablet by mouth Daily.  INSTRUCTED PER ANESTHESIA PROTOCOL    Omega-3 Fatty Acids (fish oil) 1000 MG capsule capsule Take 1 capsule by mouth Daily With Breakfast.  INSTRUCTED PER ANESTHESIA PROTOCOL    pantoprazole (PROTONIX) 40 MG EC tablet Take 1 tablet by mouth Daily.    valsartan (DIOVAN) 160 MG tablet Take 1 tablet by mouth Daily.     No current facility-administered medications on  file prior to visit.       Past Medical History:   Diagnosis Date    Calculus of gallbladder     Cancer of kidney     LEFT/KIDNEY REMOVED    Cholelithiasis     Concussion     GERD (gastroesophageal reflux disease)     History of transfusion     REPORTS THAT WHEN HAD KIDNEY REMOVED USED HIS BLOOD FOR TRANSFUSION POST SURGERY    Hyperlipidemia     Hypertension     Hypothyroidism     Kidney stones     Pain management     [facility]    Sleep apnea     NO CPAP       Allergies:  No Known Allergies    Objective    Objective       Vitals:   Heart Rate:  [61-63] 61  BP: (151-154)/(75-83) 151/75  Body mass index is 30.54 kg/m².     PHYSICAL EXAM:    General Appearance:   well developed  well nourished  HENT:   oropharynx moist  lips not cyanotic  Neck:  thyroid not enlarged  supple  Respiratory:  no respiratory distress  normal breath sounds  no rales  Cardiovascular:  no jugular venous distention  regular rhythm  apical impulse normal  S1 normal, S2 normal  no S3, no S4   no murmur  no rub, no thrill  carotid pulses normal; no bruit  pedal pulses normal  lower extremity edema: none    Skin:   warm, dry  Psychiatric:  judgement and insight appropriate  normal mood and affect        Result Review:  I have personally reviewed the available results from  [x]  Laboratory  [x]  EKG  [x]  Cardiology  [x]  Medications  [x]  Old records  []  Other:     Procedures    Results for orders placed during the hospital encounter of 06/12/24    Adult Transthoracic Echo Complete W/ Cont if Necessary Per Protocol    Interpretation Summary  Normal left ventricular systolic function.  No significant valve abnormalities noted.     Impression/Plan:  1.  Asymptomatic bradycardia: Improved.  Normal echo.  Avoid Cardizem and verapamil.  2.  Essential hypertension uncontrolled: Continue carvedilol 6.25 mg twice a day.  Continue amlodipine 5 mg once a day.  Add Diovan 160 mg once a day.  Can discontinue carvedilol when blood pressure is  controlled.  3.  Palpitations: 48-hour Holter monitoring.      Aydin Allen MD   01/14/25   11:43 EST

## 2025-01-14 ENCOUNTER — OFFICE VISIT (OUTPATIENT)
Dept: CARDIOLOGY | Facility: CLINIC | Age: 70
End: 2025-01-14
Payer: MEDICARE

## 2025-01-14 VITALS
SYSTOLIC BLOOD PRESSURE: 151 MMHG | DIASTOLIC BLOOD PRESSURE: 75 MMHG | WEIGHT: 219 LBS | BODY MASS INDEX: 30.66 KG/M2 | HEART RATE: 61 BPM | HEIGHT: 71 IN

## 2025-01-14 DIAGNOSIS — I10 HYPERTENSION, ESSENTIAL: Primary | ICD-10-CM

## 2025-01-14 DIAGNOSIS — R00.1 BRADYCARDIA, SINUS: ICD-10-CM

## 2025-01-14 DIAGNOSIS — R00.2 PALPITATIONS: ICD-10-CM

## 2025-01-14 PROCEDURE — 1159F MED LIST DOCD IN RCRD: CPT | Performed by: SPECIALIST

## 2025-01-14 PROCEDURE — 1160F RVW MEDS BY RX/DR IN RCRD: CPT | Performed by: SPECIALIST

## 2025-01-14 PROCEDURE — 99214 OFFICE O/P EST MOD 30 MIN: CPT | Performed by: SPECIALIST

## 2025-01-23 ENCOUNTER — TELEPHONE (OUTPATIENT)
Dept: CARDIOLOGY | Facility: CLINIC | Age: 70
End: 2025-01-23
Payer: MEDICARE

## 2025-01-23 NOTE — TELEPHONE ENCOUNTER
----- Message from Aydin Allen sent at 1/23/2025 11:34 AM EST -----  Notify pt Holter result is normal sinus rhythm and without any tachy- or bradyarrhythmias. Keep follow up as scheduled. Notify office if symptoms persist/worsen

## (undated) DEVICE — SKIN PREP TRAY W/CHG: Brand: MEDLINE INDUSTRIES, INC.

## (undated) DEVICE — TISSUE RETRIEVAL SYSTEM: Brand: INZII RETRIEVAL SYSTEM

## (undated) DEVICE — PENCL ES MEGADINE EZ/CLEAN BUTN W/HOLSTR 10FT

## (undated) DEVICE — SYR LL TP 10ML STRL

## (undated) DEVICE — Device

## (undated) DEVICE — GLV SURG SENSICARE SLT PF LF 8 STRL

## (undated) DEVICE — SYS IRR PUMP SGL ACTN VAC SYR 10CC

## (undated) DEVICE — FLTR SMOKE EVAC SEECLEAR CHARCOAL 8L/MIN

## (undated) DEVICE — SYS CLOSE PORTII CARTR/THOMASN XL

## (undated) DEVICE — NON-WOVEN ADHESIVE WOUND DRESSING: Brand: PRIMAPORE ADHESIVE WOUND DRSG 7.2*5CM

## (undated) DEVICE — LAPAROVUE VISIBILITY SYSTEM LAPAROSCOPIC SOLUTIONS: Brand: LAPAROVUE

## (undated) DEVICE — STERILE POLYISOPRENE POWDER-FREE SURGICAL GLOVES WITH EMOLLIENT COATING: Brand: PROTEXIS

## (undated) DEVICE — STRIP CLS WND SUTURESTRIP/PLS 0.5X4IN TP1103

## (undated) DEVICE — CYSTO PACK: Brand: MEDLINE INDUSTRIES, INC.

## (undated) DEVICE — DUAL LUMEN URETERAL CATHETER

## (undated) DEVICE — COVER,LIGHT HANDLE,FLX,1/PK: Brand: MEDLINE INDUSTRIES, INC.

## (undated) DEVICE — SEAL

## (undated) DEVICE — GLV SURG SENSICARE PI ORTHO SZ8 LF STRL

## (undated) DEVICE — SLV SCD KN/LEN ADJ EXPRSS BLENDED MD 1P/U

## (undated) DEVICE — LINER SURG CANSTR SXN S/RIGD 1500CC

## (undated) DEVICE — GOWN,REINFRCE,POLY,SIRUS,BREATH SLV,XXLG: Brand: MEDLINE

## (undated) DEVICE — STERILE POLYISOPRENE POWDER-FREE SURGICAL GLOVES: Brand: PROTEXIS

## (undated) DEVICE — TOWEL,OR,DSP,ST,BLUE,STD,4/PK,20PK/CS: Brand: MEDLINE

## (undated) DEVICE — ENDOSCOPIC VALVE WITH ADAPTER.: Brand: SURSEAL® II

## (undated) DEVICE — NITINOL STONE RETRIEVAL BASKET: Brand: ESCAPE

## (undated) DEVICE — SOL IRR NACL 0.9PCT 3000ML

## (undated) DEVICE — GW PTFE/COAT STR/TP STFF/BDY .038 150CM STRL EA/5

## (undated) DEVICE — SUT MERSILENE POLYSTR CT1 BR 0 75CM GRN

## (undated) DEVICE — SOLIDIFIER LIQLOC PLS 1500CC BT

## (undated) DEVICE — ENDOPATH XCEL WITH OPTIVIEW TECHNOLOGY BLADELESS TROCARS WITH STABILITY SLEEVES: Brand: ENDOPATH XCEL OPTIVIEW

## (undated) DEVICE — SOL IRRG H2O PL/BG 1000ML STRL

## (undated) DEVICE — INTENDED FOR TISSUE SEPARATION, AND OTHER PROCEDURES THAT REQUIRE A SHARP SURGICAL BLADE TO PUNCTURE OR CUT.: Brand: BARD-PARKER ® CARBON RIB-BACK BLADES

## (undated) DEVICE — SOL IRR NACL 0.9PCT BT 1000ML

## (undated) DEVICE — ARM DRAPE

## (undated) DEVICE — DAVINCI-LF: Brand: MEDLINE INDUSTRIES, INC.

## (undated) DEVICE — GW SUREGLIDE FLXTIP ANG/TP .038 3X150CM EA/5

## (undated) DEVICE — CONN JET HYDRA H20 AUXILIARY DISP

## (undated) DEVICE — BASIC SINGLE BASIN-LF: Brand: MEDLINE INDUSTRIES, INC.

## (undated) DEVICE — URETERAL ACCESS SHEATH SET: Brand: NAVIGATOR HD

## (undated) DEVICE — ANTIBACTERIAL UNDYED BRAIDED (POLYGLACTIN 910), SYNTHETIC ABSORBABLE SUTURE: Brand: COATED VICRYL

## (undated) DEVICE — Device: Brand: DEFENDO AIR/WATER/SUCTION AND BIOPSY VALVE

## (undated) DEVICE — Y-TYPE TUR/BLADDER IRRIGATION SET, REGULATING CLAMP